# Patient Record
Sex: MALE | Race: WHITE | Employment: OTHER | URBAN - METROPOLITAN AREA
[De-identification: names, ages, dates, MRNs, and addresses within clinical notes are randomized per-mention and may not be internally consistent; named-entity substitution may affect disease eponyms.]

---

## 2020-07-09 ENCOUNTER — HOSPITAL ENCOUNTER (INPATIENT)
Age: 65
LOS: 6 days | Discharge: HOME | DRG: 175 | End: 2020-07-15
Attending: EMERGENCY MEDICINE | Admitting: HOSPITALIST
Payer: MEDICARE

## 2020-07-09 ENCOUNTER — APPOINTMENT (OUTPATIENT)
Dept: CT IMAGING | Age: 65
DRG: 175 | End: 2020-07-09
Attending: EMERGENCY MEDICINE
Payer: MEDICARE

## 2020-07-09 ENCOUNTER — APPOINTMENT (OUTPATIENT)
Dept: GENERAL RADIOLOGY | Age: 65
DRG: 175 | End: 2020-07-09
Attending: EMERGENCY MEDICINE
Payer: MEDICARE

## 2020-07-09 DIAGNOSIS — I26.09 ACUTE PULMONARY EMBOLISM WITH ACUTE COR PULMONALE, UNSPECIFIED PULMONARY EMBOLISM TYPE (HCC): Primary | ICD-10-CM

## 2020-07-09 DIAGNOSIS — R00.0 SINUS TACHYCARDIA: ICD-10-CM

## 2020-07-09 DIAGNOSIS — R06.02 SOB (SHORTNESS OF BREATH): ICD-10-CM

## 2020-07-09 PROBLEM — I26.99 PULMONARY EMBOLISM (HCC): Status: ACTIVE | Noted: 2020-07-09

## 2020-07-09 LAB
ALBUMIN SERPL-MCNC: 3.7 G/DL (ref 3.4–5)
ALBUMIN/GLOB SERPL: 1 {RATIO} (ref 0.8–1.7)
ALP SERPL-CCNC: 93 U/L (ref 45–117)
ALT SERPL-CCNC: 52 U/L (ref 16–61)
ANION GAP SERPL CALC-SCNC: 5 MMOL/L (ref 3–18)
APTT PPP: 151.1 SEC (ref 23–36.4)
APTT PPP: 31.9 SEC (ref 23–36.4)
AST SERPL-CCNC: 25 U/L (ref 10–38)
ATRIAL RATE: 113 BPM
BASOPHILS # BLD: 0 K/UL (ref 0–0.1)
BASOPHILS NFR BLD: 0 % (ref 0–2)
BILIRUB SERPL-MCNC: 1.9 MG/DL (ref 0.2–1)
BNP SERPL-MCNC: 6979 PG/ML (ref 0–900)
BUN SERPL-MCNC: 21 MG/DL (ref 7–18)
BUN/CREAT SERPL: 17 (ref 12–20)
CALCIUM SERPL-MCNC: 9.1 MG/DL (ref 8.5–10.1)
CALCULATED P AXIS, ECG09: 64 DEGREES
CALCULATED R AXIS, ECG10: 83 DEGREES
CALCULATED T AXIS, ECG11: 3 DEGREES
CHLORIDE SERPL-SCNC: 107 MMOL/L (ref 100–111)
CK MB CFR SERPL CALC: 5 % (ref 0–4)
CK MB SERPL-MCNC: 2.8 NG/ML (ref 5–25)
CK SERPL-CCNC: 56 U/L (ref 39–308)
CO2 SERPL-SCNC: 26 MMOL/L (ref 21–32)
CREAT SERPL-MCNC: 1.22 MG/DL (ref 0.6–1.3)
D DIMER PPP FEU-MCNC: 5.38 UG/ML(FEU)
DIAGNOSIS, 93000: NORMAL
DIFFERENTIAL METHOD BLD: ABNORMAL
EOSINOPHIL # BLD: 0 K/UL (ref 0–0.4)
EOSINOPHIL NFR BLD: 0 % (ref 0–5)
ERYTHROCYTE [DISTWIDTH] IN BLOOD BY AUTOMATED COUNT: 13.8 % (ref 11.6–14.5)
GLOBULIN SER CALC-MCNC: 3.6 G/DL (ref 2–4)
GLUCOSE SERPL-MCNC: 94 MG/DL (ref 74–99)
HCT VFR BLD AUTO: 53 % (ref 36–48)
HGB BLD-MCNC: 17.9 G/DL (ref 13–16)
LYMPHOCYTES # BLD: 1.7 K/UL (ref 0.9–3.6)
LYMPHOCYTES NFR BLD: 14 % (ref 21–52)
MAGNESIUM SERPL-MCNC: 2.4 MG/DL (ref 1.6–2.6)
MCH RBC QN AUTO: 31.7 PG (ref 24–34)
MCHC RBC AUTO-ENTMCNC: 33.8 G/DL (ref 31–37)
MCV RBC AUTO: 93.8 FL (ref 74–97)
MONOCYTES # BLD: 1.1 K/UL (ref 0.05–1.2)
MONOCYTES NFR BLD: 9 % (ref 3–10)
NEUTS SEG # BLD: 9.8 K/UL (ref 1.8–8)
NEUTS SEG NFR BLD: 77 % (ref 40–73)
P-R INTERVAL, ECG05: 130 MS
PLATELET # BLD AUTO: 169 K/UL (ref 135–420)
PMV BLD AUTO: 10.2 FL (ref 9.2–11.8)
POTASSIUM SERPL-SCNC: 4.8 MMOL/L (ref 3.5–5.5)
PROT SERPL-MCNC: 7.3 G/DL (ref 6.4–8.2)
Q-T INTERVAL, ECG07: 340 MS
QRS DURATION, ECG06: 78 MS
QTC CALCULATION (BEZET), ECG08: 466 MS
RBC # BLD AUTO: 5.65 M/UL (ref 4.7–5.5)
SODIUM SERPL-SCNC: 138 MMOL/L (ref 136–145)
TROPONIN I SERPL-MCNC: 0.04 NG/ML (ref 0–0.04)
VENTRICULAR RATE, ECG03: 113 BPM
WBC # BLD AUTO: 12.6 K/UL (ref 4.6–13.2)

## 2020-07-09 PROCEDURE — 96365 THER/PROPH/DIAG IV INF INIT: CPT

## 2020-07-09 PROCEDURE — 80053 COMPREHEN METABOLIC PANEL: CPT

## 2020-07-09 PROCEDURE — 65660000000 HC RM CCU STEPDOWN

## 2020-07-09 PROCEDURE — 82550 ASSAY OF CK (CPK): CPT

## 2020-07-09 PROCEDURE — 85025 COMPLETE CBC W/AUTO DIFF WBC: CPT

## 2020-07-09 PROCEDURE — 96376 TX/PRO/DX INJ SAME DRUG ADON: CPT

## 2020-07-09 PROCEDURE — 85730 THROMBOPLASTIN TIME PARTIAL: CPT

## 2020-07-09 PROCEDURE — 74011636320 HC RX REV CODE- 636/320: Performed by: EMERGENCY MEDICINE

## 2020-07-09 PROCEDURE — 74011250637 HC RX REV CODE- 250/637: Performed by: HOSPITALIST

## 2020-07-09 PROCEDURE — 99284 EMERGENCY DEPT VISIT MOD MDM: CPT

## 2020-07-09 PROCEDURE — 71275 CT ANGIOGRAPHY CHEST: CPT

## 2020-07-09 PROCEDURE — 83880 ASSAY OF NATRIURETIC PEPTIDE: CPT

## 2020-07-09 PROCEDURE — 85379 FIBRIN DEGRADATION QUANT: CPT

## 2020-07-09 PROCEDURE — 71045 X-RAY EXAM CHEST 1 VIEW: CPT

## 2020-07-09 PROCEDURE — 74011250636 HC RX REV CODE- 250/636: Performed by: EMERGENCY MEDICINE

## 2020-07-09 PROCEDURE — 36415 COLL VENOUS BLD VENIPUNCTURE: CPT

## 2020-07-09 PROCEDURE — 87635 SARS-COV-2 COVID-19 AMP PRB: CPT

## 2020-07-09 PROCEDURE — 83735 ASSAY OF MAGNESIUM: CPT

## 2020-07-09 PROCEDURE — 93005 ELECTROCARDIOGRAM TRACING: CPT

## 2020-07-09 RX ORDER — ASCORBIC ACID 250 MG
500 TABLET ORAL DAILY
Status: DISCONTINUED | OUTPATIENT
Start: 2020-07-10 | End: 2020-07-15

## 2020-07-09 RX ORDER — CHOLECALCIFEROL (VITAMIN D3) 125 MCG
5 CAPSULE ORAL
Status: DISCONTINUED | OUTPATIENT
Start: 2020-07-09 | End: 2020-07-15

## 2020-07-09 RX ORDER — ZINC SULFATE 50(220)MG
1 CAPSULE ORAL DAILY
Status: DISCONTINUED | OUTPATIENT
Start: 2020-07-10 | End: 2020-07-15

## 2020-07-09 RX ORDER — CYANOCOBALAMIN (VITAMIN B-12) 500 MCG
2000 TABLET ORAL DAILY
Status: DISCONTINUED | OUTPATIENT
Start: 2020-07-10 | End: 2020-07-15

## 2020-07-09 RX ORDER — PANTOPRAZOLE SODIUM 40 MG/1
40 TABLET, DELAYED RELEASE ORAL DAILY
Status: DISCONTINUED | OUTPATIENT
Start: 2020-07-10 | End: 2020-07-15 | Stop reason: HOSPADM

## 2020-07-09 RX ORDER — HEPARIN SODIUM 1000 [USP'U]/ML
80 INJECTION, SOLUTION INTRAVENOUS; SUBCUTANEOUS ONCE
Status: COMPLETED | OUTPATIENT
Start: 2020-07-09 | End: 2020-07-09

## 2020-07-09 RX ORDER — HEPARIN SODIUM 10000 [USP'U]/100ML
18-36 INJECTION, SOLUTION INTRAVENOUS
Status: DISPENSED | OUTPATIENT
Start: 2020-07-09 | End: 2020-07-14

## 2020-07-09 RX ADMIN — IOPAMIDOL 100 ML: 755 INJECTION, SOLUTION INTRAVENOUS at 13:01

## 2020-07-09 RX ADMIN — HEPARIN SODIUM 15 UNITS/KG/HR: 10000 INJECTION, SOLUTION INTRAVENOUS at 23:26

## 2020-07-09 RX ADMIN — Medication 5 MG: at 22:20

## 2020-07-09 RX ADMIN — HEPARIN SODIUM 18 UNITS/KG/HR: 10000 INJECTION, SOLUTION INTRAVENOUS at 13:56

## 2020-07-09 RX ADMIN — HEPARIN SODIUM 8530 UNITS: 1000 INJECTION INTRAVENOUS; SUBCUTANEOUS at 13:56

## 2020-07-09 NOTE — ROUTINE PROCESS
Bedside and Verbal shift change report given to CAROLE Garcia R.N. (oncoming nurse) by LETICIA Zaragoza R.N. (offgoing nurse). Report included the following information SBAR, Kardex, Intake/Output, MAR, Accordion, Recent Results and Med Rec Status.

## 2020-07-09 NOTE — ED PROVIDER NOTES
EMERGENCY DEPARTMENT HISTORY AND PHYSICAL EXAM    Date: 7/9/2020  Patient Name: Judie Dobbins    History of Presenting Illness     Chief Complaint   Patient presents with    Shortness of Breath         History Provided By: Patient    Judie Dobbins is a 72 y.o. male who presents to the emergency department C/O shortness of breath. Patient states his shortness of breath has been intermittently been getting worse over the last 3-1/2 months. He does note that he has been on a boat since March sailing from the [facility]. States he is originally from [facility]. He denies any chest pain. States that shortness of breath is worse on exertion. Denies any cough or fevers or direct contact with a coronavirus patient to his knowledge. Patient does note that he has a history of prostate cancer that is in remission as well as a pulmonary embolism that occurred after his knee surgery about 8 years ago. States he is on daily aspirin but no other blood thinning medications. States that shortness of breath started to become worse Saturday about 4 days ago and is now getting to the point where he is unable to walk several steps without getting out of breath. .     PCP: Other, MD Fernanda        Past History     Past Medical History:  Past Medical History:   Diagnosis Date    D[facility] of leg (deep venous thrombosis) [address] [address]     H pylori ulcer     PE (pulmonary thromboembolism) ([facility] [address]        Past Surgical History:  History reviewed. No pertinent surgical history. Family History:  History reviewed. No pertinent family history. Social History:  Social History     Tobacco Use    Smoking status: Never Smoker    Smokeless tobacco: Never Used   Substance Use Topics    Alcohol use: Yes    Drug use: Not on file       Allergies:  No Known Allergies      Review of Systems   Review of Systems   Constitutional: Positive for fatigue. Negative for fever.    Respiratory: Positive for chest tightness and shortness of breath. Cardiovascular: Negative for chest pain. Gastrointestinal: Negative for abdominal pain, nausea and vomiting. Neurological: Positive for weakness. All other systems reviewed and are negative. Physical Exam     Vitals:    07/09/20 1134 07/09/20 1402   BP: (!) 140/96 131/88   Pulse: (!) 121 (!) 110   Resp: 20 18   Temp: 97.1 °F (36.2 °C)    SpO2: 97% (!) 56%   Weight: 106.6 kg (235 lb)    Height: 6' 2\" (1.88 m)      Physical Exam    Nursing notes and vital signs reviewed    Airway: intact, speaking normally  Breathing:  Moderate distress, no cyanosis  Circulation: Peripheral pulses equal    Constitutional: Non toxic appearing, patient appears in moderate distress  HEENT & Neck: Normocephalic, Atraumatic, PERRL, EOMI, No rhinorrhea, external nose normal, external ears normal, mucous membranes moist, No stridor, No JVD  Cardiovascular: Tachycardic, regular rhythm, no murmurs  Chest: Nephric and increased work of breathing and chest excursion bilaterally  Lungs: Clear to ausculation bilaterally  Abdomen: Abdominal breathing noted, soft, non tender, non distended, normoactive bowel sounds, No rigidity, no peritoneal signs  Musculoskeletal: No evidence of trauma or deformity to the back or neck  Extremities: No evidence of trauma or deformity, no LE edema  Skin: Warm, No obvious rashes  Neuro: Alert and oriented x 3, CN 2-12 intact, normal speech, strength and sensation full and symmetric bilaterally, normal coordination  Psychiatric: Normal mood and affect      Diagnostic Study Results     Labs -     Recent Results (from the past 72 hour(s))   EKG, 12 LEAD, INITIAL    Collection Time: 07/09/20 11:45 AM   Result Value Ref Range    Ventricular Rate 113 BPM    Atrial Rate 113 BPM    P-R Interval 130 ms    QRS Duration 78 ms    Q-T Interval 340 ms    QTC Calculation (Bezet) 466 ms    Calculated P Axis 64 degrees    Calculated R Axis 83 degrees    Calculated T Axis 3 degrees    Diagnosis       Sinus tachycardia  T wave abnormality, consider inferior ischemia  Abnormal ECG  No previous ECGs available     CBC WITH AUTOMATED DIFF    Collection Time: 07/09/20 11:47 AM   Result Value Ref Range    WBC 12.6 4.6 - 13.2 K/uL    RBC 5.65 (H) 4.70 - 5.50 M/uL    HGB 17.9 (H) 13.0 - 16.0 g/dL    HCT 53.0 (H) 36.0 - 48.0 %    MCV 93.8 74.0 - 97.0 FL    MCH 31.7 24.0 - 34.0 PG    MCHC 33.8 31.0 - 37.0 g/dL    RDW 13.8 11.6 - 14.5 %    PLATELET 487 735 - 747 K/uL    MPV 10.2 9.2 - 11.8 FL    NEUTROPHILS 77 (H) 40 - 73 %    LYMPHOCYTES 14 (L) 21 - 52 %    MONOCYTES 9 3 - 10 %    EOSINOPHILS 0 0 - 5 %    BASOPHILS 0 0 - 2 %    ABS. NEUTROPHILS 9.8 (H) 1.8 - 8.0 K/UL    ABS. LYMPHOCYTES 1.7 0.9 - 3.6 K/UL    ABS. MONOCYTES 1.1 0.05 - 1.2 K/UL    ABS. EOSINOPHILS 0.0 0.0 - 0.4 K/UL    ABS. BASOPHILS 0.0 0.0 - 0.1 K/UL    DF AUTOMATED     METABOLIC PANEL, COMPREHENSIVE    Collection Time: 07/09/20 11:47 AM   Result Value Ref Range    Sodium 138 136 - 145 mmol/L    Potassium 4.8 3.5 - 5.5 mmol/L    Chloride 107 100 - 111 mmol/L    CO2 26 21 - 32 mmol/L    Anion gap 5 3.0 - 18 mmol/L    Glucose 94 74 - 99 mg/dL    BUN 21 (H) 7.0 - 18 MG/DL    Creatinine 1.22 0.6 - 1.3 MG/DL    BUN/Creatinine ratio 17 12 - 20      GFR est AA >60 >60 ml/min/1.73m2    GFR est non-AA 60 (L) >60 ml/min/1.73m2    Calcium 9.1 8.5 - 10.1 MG/DL    Bilirubin, total 1.9 (H) 0.2 - 1.0 MG/DL    ALT (SGPT) 52 16 - 61 U/L    AST (SGOT) 25 10 - 38 U/L    Alk.  phosphatase 93 45 - 117 U/L    Protein, total 7.3 6.4 - 8.2 g/dL    Albumin 3.7 3.4 - 5.0 g/dL    Globulin 3.6 2.0 - 4.0 g/dL    A-G Ratio 1.0 0.8 - 1.7     NT-PRO BNP    Collection Time: 07/09/20 11:47 AM   Result Value Ref Range    NT pro-BNP 6,979 (H) 0 - 900 PG/ML   CARDIAC PANEL,(CK, CKMB & TROPONIN)    Collection Time: 07/09/20 11:47 AM   Result Value Ref Range    CK - MB 2.8 <3.6 ng/ml    CK-MB Index 5.0 (H) 0.0 - 4.0 %    CK 56 39 - 308 U/L    Troponin-I, QT 0.04 0.0 - 0.045 NG/ML   D DIMER Collection Time: 07/09/20 11:47 AM   Result Value Ref Range    D DIMER 5.38 (H) <0.46 ug/ml(FEU)   MAGNESIUM    Collection Time: 07/09/20 11:47 AM   Result Value Ref Range    Magnesium 2.4 1.6 - 2.6 mg/dL   PTT    Collection Time: 07/09/20 11:47 AM   Result Value Ref Range    aPTT 31.9 23.0 - 36.4 SEC       Radiologic Studies -   CTA CHEST W OR W WO CONT   Final Result   IMPRESSION:      1. Extensive bilateral lobar and segmental pulmonary emboli as detailed above,   with evidence of right heart strain. This was discussed with Dr. Guevara Ling at   1:21 PM.      2. Mild nonspecific peripheral groundglass density right upper lobe. 3. 2 mm right upper lobe nodule nonspecific but likely benign. 2.      XR CHEST PORT   Final Result   IMPRESSION:      1. No acute cardiopulmonary process. CT Results  (Last 48 hours)               07/09/20 1311  CTA CHEST W OR W WO CONT Final result    Impression:  IMPRESSION:       1. Extensive bilateral lobar and segmental pulmonary emboli as detailed above,   with evidence of right heart strain. This was discussed with Dr. Guevara Ling at   1:21 PM.       2. Mild nonspecific peripheral groundglass density right upper lobe. 3. 2 mm right upper lobe nodule nonspecific but likely benign. 2.       Narrative:  EXAM: CTA Chest       CLINICAL INDICATION/HISTORY: Shortness of breath, tachycardia. Possible PE.       COMPARISON: Plain film chest same day       TECHNIQUE: Axial CT imaging from the thoracic inlet through the diaphragm with   intravenous contrast utilizing CTA study for pulmonary artery evaluation. Coronal and sagittal MIP reformations were generated at a separate workstation. One or more dose reduction techniques were used on this CT: automated exposure   control, adjustment of the mAs and/or kVp according to patient's size, and   iterative reconstruction techniques.  The specific techniques utilized on this CT   exam have been documented in the patient's electronic medical record. Digital   imaging and communications and medicine (DICOM) format image data are available   to nonaffiliated external healthcare facilities or entities on a secure, media   free, reciprocally searchable basis with patient authorization for at least a 12   month period after this study. _______________       FINDINGS:       EXAM QUALITY: Overall exam quality is adequate. Pulmonary arterial enhancement   is adequate. The breath hold is satisfactory. PULMONARY ARTERIES: There is extensive pulmonary emboli, with complete occlusion   of the proximal left lower lobe artery with no contrast seen more distally   within the left lower lobe peripheral vessels. There is minimal eccentric clot   within proximal lingular segment. There is some partial nonocclusive clot within   anterior segment left upper lobe artery. There is complete occlusion of the   proximal right upper lobe artery with absent contrast seen more peripherally in   the right upper lobe vessels. Partial nonocclusive clot seen within the proximal   middle lobe arteries. There is occlusive clot involving proximal lateral   segmental right lower lobe artery. No filling defect within the main pulmonary   arteries. MEDIASTINUM: There is evidence of right heart strain with RV/LV ratio greater   than 1, right ventricle measuring 5.5 cm and left ventricle measuring 3.9 cm. There is mild associated septal bowing and some reflux of contrast into the IVC   and hepatic veins. There is also prominence of the azygos vein. Normal heart   size with no pericardial effusion. Coronary artery calcifications. Thoracic   aorta unremarkable. LYMPH NODES: No enlarged mediastinal or hilar nodes by size criteria. AIRWAY: Unremarkable. LUNGS: There is 2 mm diameter parenchymal nodule within the posterior right   upper lobe, axial image #30.  Mild peripheral groundglass density lateral and   posterior lateral right upper lobe. No other area of consolidation or mass. PLEURA: No pleural effusion or pneumothorax. UPPER ABDOMEN: Visualized upper abdomen is unremarkable. .       OTHER: Degenerative lower cervical and thoracic spondylosis with no acute or   aggressive osseous findings. Proximal right humeral prosthesis noted. _______________               CXR Results  (Last 48 hours)               07/09/20 1155  XR CHEST PORT Final result    Impression:  IMPRESSION:       1. No acute cardiopulmonary process. Narrative:  EXAM: XR CHEST PORT       CLINICAL INDICATION/HISTORY: sob, tachycardia   -Additional: None       COMPARISON: None       TECHNIQUE: Frontal view of the chest       _______________       FINDINGS:       HEART AND MEDIASTINUM: Midline cardiac silhouette, normal in size. Unremarkable   hilar vascular structures. LUNGS AND PLEURAL SPACES: No focal consolidation, parenchymal opacity. No   pneumothorax or pleural effusion. BONY THORAX AND SOFT TISSUES: Total right shoulder arthroplasty. Moderate   appearing Left glenohumeral DJD. Otherwise, no acute osseous abnormality       _______________                 Medications given in the ED-  Medications   heparin 25,000 units in D5W 250 ml infusion (18 Units/kg/hr × 106.6 kg IntraVENous New Bag 7/9/20 1356)   iopamidoL (ISOVUE-370) 76 % injection 100 mL (100 mL IntraVENous Given 7/9/20 1301)   heparin (porcine) 1,000 unit/mL injection 8,530 Units (8,530 Units IntraVENous Given 7/9/20 1356)         Medical Decision Making   I am the first provider for this patient. I reviewed the vital signs, available nursing notes, past medical history, past surgical history, family history and social history. Vital Signs-Reviewed the patient's vital signs.     Pulse Oximetry Analysis - 97% on Room air     Cardiac Monitor:  Rate: 107 bpm  Rhythm: sinus    EKG interpretation: (Preliminary)  EKG read by Dr. Dyllan Harris 12:02 PM   Rate 113 sinus tachycardia, nonspecific T wave inversion in lead III as well as the anterior leads, no ST changes    Records Reviewed: Nursing Notes and Old Medical Records    Procedures:  Procedures    ED Course:   Considering the patient's history, tachypnea and tachycardia there is a high suspicion for pulmonary embolism. Chest x-ray shows no evidence of acute process. D-dimer noted to be 5 and BNP noted be 6000, negative troponin. Chest x-ray normal.  CTA was read by radiology and contacted me regarding multiple pulmonary embolisms in the left lower lobe occlusive as well as occlusive in the right upper lobe with some segmental in the right lung base with some mild heart strain    CONSULT NOTE:   Dr. Ross Carvalho spoke with Dr. Yesenia Sparks   Specialty: pulm  Discussed pt's hx, disposition, and available diagnostic and imaging results over the telephone. Reviewed care plans. Recommends to continue with IV heparin and admit to hospitalist service. After discussion with hospitalist service regarding the patient's case it was noted that the patient has a right upper lobe groundglass appearance and had recommended a COVID swab. Will place the patient on droplet precautions and obtain coronavirus swab. The patient himself denies any cough, fevers or body aches or headaches or sore throat or recent sensation of illness    Diagnosis and Disposition     Critical Care: 1:28 PM  I have spent 35 minutes of critical care time involved in lab review, consultations with specialist, family decision-making, and documentation. During this entire length of time I was immediately available to the patient. Critical Care: The reason for providing this level of medical care for this critically ill patient was due a critical illness that impaired one or more vital organ systems such that there was a high probability of imminent or life threatening deterioration in the patients condition.  This care involved high complexity decision making to assess, manipulate, and support vital system functions, to treat this degreee vital organ system failure and to prevent further life threatening deterioration of the patients condition. Core Measures:  For Hospitalized Patients:    1. Hospitalization Decision Time:  The decision to hospitalize the patient was made by Nettie Mccarthy DO at 2:25 PM on 7/9/2020    2. Aspirin: Aspirin was not given because the patient did not present with a stroke at the time of their Emergency Department evaluation    1:28 PM  Patient is being admitted to the hospital by Dr. Remi Lambert. The results of their tests and reasons for their admission have been discussed with them and/or available family. They convey agreement and understanding for the need to be admitted and for their admission diagnosis. CONDITIONS ON ADMISSION:  Sepsis is not present at the time of admission. Deep Vein Thrombosis possibly present at the time of admission. Thrombosis is present at the time of admission. Urinary Tract Infection is not present at the time of admission. Pneumonia is not present at the time of admission. MRSA is not present at the time of admission. Wound infection is not present at the time of admission. Pressure Ulcer is not present at the time of admission. CLINICAL IMPRESSION:    1. Acute pulmonary embolism with acute cor pulmonale, unspecified pulmonary embolism type ([address]    2. Sinus tachycardia    3. SOB (shortness of breath)          Please note that this dictation was completed with [employer], the computer voice recognition software. Quite often unanticipated grammatical, syntax, homophones, and other interpretive errors are inadvertently transcribed by the computer software. Please disregard these errors. Please excuse any errors that have escaped final proofreading.

## 2020-07-09 NOTE — PROGRESS NOTES
Problem: Falls - Risk of  Goal: *Absence of Falls  Description: Document [facility] Fall Risk and appropriate interventions in the flowsheet.   Outcome: Progressing Towards Goal  Note: Fall Risk Interventions:            Medication Interventions: Patient to call before getting OOB, Teach patient to arise slowly                   Problem: Patient Education: Go to Patient Education Activity  Goal: Patient/Family Education  Outcome: Progressing Towards Goal

## 2020-07-09 NOTE — PROGRESS NOTES
1445:Patient care received. Patient alert and oriented x 4. Patient resting in bed denies pain. Patient stable. Call light with in reach bed in lowest position.

## 2020-07-09 NOTE — ED TRIAGE NOTES
Patient with complaints of intermittent shortness of breath for the past 3.5 month and states that it started this time on Saturday. Patient states that he lives on a boat and has been unable to seek medical attention.

## 2020-07-09 NOTE — ED NOTES
TRANSFER - OUT REPORT:    Verbal report given to 354(name) юлия Eisenberg  being transferred to Sharon(unit) for ordered procedure       Report consisted of patients Situation, Background, Assessment and   Recommendations(SBAR). Information from the following report(s) SBAR, Kardex and ED Summary was reviewed with the receiving nurse. Lines:   Peripheral IV 07/09/20 Left Antecubital (Active)        Opportunity for questions and clarification was provided.       Patient transported with:   Registered Nurse

## 2020-07-09 NOTE — H&P
History & Physical    Patient: Genaro Chowdhury MRN: [id number]  CSN: [id number]    YOB: 1955  Age: 72 y.o. Sex: male      DOA: 7/9/2020  Primary Care Provider:  Carla, MD Fernanda      Assessment/Plan     Patient Active Problem List   Diagnosis Code    Acute pulmonary embolism with acute cor pulmonale (HCC) I26.09    Sinus tachycardia R00.0    SOB (shortness of breath) R06.02       Admit to COVID 19 unit    Acute PE with acute cor pulmonale -  CTA chest with bilateral PE and evidence of heart strain. Started on heparin drip. Discussed with patient about choice of anticoagulation since he is living on a boat he would prefer NOAC. We will switch to this before discharge. Check echo  Check lower extremity Dopplers. Suspected COVID-19-  Given PE and CT findings of groundglass opacities will check for COVID-19. We will start on vitamin C, vitamin D3, zinc, melatonin. No leukocytosis or fever. Will hold onto starting any antibiotics. Estimated length of stay :2-3 days    CC: SOB       HPI:     Genaro Chowdhury is a 72 y.o. male who has past medical history of prostate cancer, H pylori, DVT, peptic ulcer disease presents to ER with concerns of shortness of breath. He reports that he has been having shortness of breath for about 3-1/2 months which is on and off. He has been staying on a boat for over 2 years. He reports that about a month ago he had some shortness of breath that was more than usual however it subsided subsided. This time he his shortness of breath has been progressive and getting worse. He denies any chest pain or cough. Or any syncopal episode. He reports that he had DVT about 8 years ago after he had his knee surgery. He was on anticoagulation. He has history of prostate cancer. In ER his CTA chest showed bilateral PE with evidence of right heart strain. CT chest also showed some groundglass opacities in lower lungs. His NT proBNP at 6979.     Past Medical History: Diagnosis Date    DVT of leg (deep venous thrombosis) (HCC)     H pylori ulcer     PE [facility]embol[facility]) [facility] [address]     Prostate CA ([facility]        History reviewed. No pertinent surgical history. History reviewed. No pertinent family history. Social History     Socioeconomic History    Marital status:      Spouse name: Not on file    Number of children: Not on file    Years of education: Not on file    Highest education level: Not on file   Tobacco Use    Smoking status: Never Smoker    Smokeless tobacco: Never Used   Substance and Sexual Activity    Alcohol use: Yes       Prior to Admission medications    Not on File       No Known Allergies    Review of Systems  Gen: No fever, chills, malaise, weight loss/gain. Heent: No headache, rhinorrhea, epistaxis, ear pain, hearing loss, sinus pain, neck pain/stiffness, sore throat. Heart: No chest pain, palpitations, BARRERA, pnd, or orthopnea. Resp: see above. GI: No nausea, vomiting, diarrhea, constipation, melena or hematochezia. : No urinary obstruction, dysuria or hematuria. Derm: No rash, new skin lesion or pruritis. Musc/skeletal: no bone or joint complains. Vasc: No edema, cyanosis or claudication. See above  Endo: No heat/cold intolerance, no polyuria,polydipsia or polyphagia. Neuro: No unilateral weakness, numbness, tingling. No seizures. Heme: No easy bruising or bleeding. Physical Exam:     Physical Exam:  Visit Vitals  /86 (BP 1 Location: Right arm, BP Patient Position: At rest)   Pulse 97   Temp 98.6 °F (37 °C)   Resp 20   Ht 6' 2\" (1.88 m)   Wt 106.6 kg (235 lb)   SpO2 98%   BMI 30.17 kg/m²      O2 Device: Room air    Temp (24hrs), Av.9 °F (36.6 °C), Min:97.1 °F (36.2 °C), Max:98.6 °F (37 °C)    No intake/output data recorded. No intake/output data recorded. General:  Awake, cooperative, no distress. Head:  Normocephalic, without obvious abnormality, atraumatic.    Eyes: Conjunctivae/corneas clear, sclera anicteric, PERRL, EOMs intact. Nose: Nares normal. No drainage or sinus tenderness. Throat: Lips, mucosa, and tongue normal.    Neck: Supple, symmetrical, trachea midline, no adenopathy. Lungs:   Clear to auscultation bilaterally. Heart:   S1, S2, no murmur, click, rub or gallop. Abdomen: Soft, non-tender. Bowel sounds normal. No masses,  No organomegaly. Extremities: Extremities normal, atraumatic, no cyanosis or edema. Capillary refill normal.   Pulses: 2+ and symmetric all extremities. Skin: Skin color pink, turgor normal. No rashes or lesions   Neurologic: CNII-XII intact. No focal motor or sensory deficit.        Labs Reviewed:    CMP:   Lab Results   Component Value Date/Time     07/09/2020 11:47 AM    K 4.8 07/09/2020 11:47 AM     07/09/2020 11:47 AM    CO2 26 07/09/2020 11:47 AM    AGAP 5 07/09/2020 11:47 AM    GLU 94 07/09/2020 11:47 AM    BUN 21 (H) 07/09/2020 11:47 AM    CREA 1.22 07/09/2020 11:47 AM    GFRAA >60 07/09/2020 11:47 AM    GFRNA 60 (L) 07/09/2020 11:47 AM    CA 9.1 07/09/2020 11:47 AM    MG 2.4 07/09/2020 11:47 AM    ALB 3.7 07/09/2020 11:47 AM    TP 7.3 07/09/2020 11:47 AM    GLOB 3.6 07/09/2020 11:47 AM    AGRAT 1.0 07/09/2020 11:47 AM    ALT 52 07/09/2020 11:47 AM     CBC:   Lab Results   Component Value Date/Time    WBC 12.6 07/09/2020 11:47 AM    HGB 17.9 (H) 07/09/2020 11:47 AM    HCT 53.0 (H) 07/09/2020 11:47 AM     07/09/2020 11:47 AM     All Cardiac Markers in the last 24 hours:   Lab Results   Component Value Date/Time    CPK 56 07/09/2020 11:47 AM    CKMB 2.8 07/09/2020 11:47 AM    CKND1 5.0 (H) 07/09/2020 11:47 AM    TROIQ 0.04 07/09/2020 11:47 AM         Procedures/imaging: see electronic medical records for all procedures/Xrays and details which were not copied into this note but were reviewed prior to creation of Plan    Please note that this dictation was completed with [employer], the computer voice recognition software. Quite often unanticipated grammatical, syntax, homophones, and other interpretive errors are inadvertently transcribed by the computer software. Please disregard these errors. Please excuse any errors that have escaped final proofreading.         CC: Fernanda Aguirre MD

## 2020-07-09 NOTE — ROUTINE PROCESS
TRANSFER - IN REPORT: 
 
Verbal report received from IZABELA Salazar R.N.(name) on Sammy Cook  being received from ED(unit) for routine progression of care Report consisted of patients Situation, Background, Assessment and  
Recommendations(SBAR). Information from the following report(s) SBAR, Kardex, Intake/Output, MAR, Accordion, Recent Results and Med Rec Status was reviewed with the receiving nurse. Opportunity for questions and clarification was provided. Assessment completed upon patients arrival to unit and care assumed.

## 2020-07-10 LAB
APTT PPP: 83.4 SEC (ref 23–36.4)
BASOPHILS # BLD: 0 K/UL (ref 0–0.1)
BASOPHILS NFR BLD: 0 % (ref 0–2)
DIFFERENTIAL METHOD BLD: ABNORMAL
EOSINOPHIL # BLD: 0.1 K/UL (ref 0–0.4)
EOSINOPHIL NFR BLD: 1 % (ref 0–5)
ERYTHROCYTE [DISTWIDTH] IN BLOOD BY AUTOMATED COUNT: 13.8 % (ref 11.6–14.5)
HCT VFR BLD AUTO: 49.6 % (ref 36–48)
HGB BLD-MCNC: 16.5 G/DL (ref 13–16)
LYMPHOCYTES # BLD: 2 K/UL (ref 0.9–3.6)
LYMPHOCYTES NFR BLD: 22 % (ref 21–52)
MCH RBC QN AUTO: 31.1 PG (ref 24–34)
MCHC RBC AUTO-ENTMCNC: 33.3 G/DL (ref 31–37)
MCV RBC AUTO: 93.4 FL (ref 74–97)
MONOCYTES # BLD: 0.9 K/UL (ref 0.05–1.2)
MONOCYTES NFR BLD: 9 % (ref 3–10)
NEUTS SEG # BLD: 6.4 K/UL (ref 1.8–8)
NEUTS SEG NFR BLD: 68 % (ref 40–73)
PLATELET # BLD AUTO: 149 K/UL (ref 135–420)
PMV BLD AUTO: 10.3 FL (ref 9.2–11.8)
RBC # BLD AUTO: 5.31 M/UL (ref 4.7–5.5)
WBC # BLD AUTO: 9.4 K/UL (ref 4.6–13.2)

## 2020-07-10 PROCEDURE — 85730 THROMBOPLASTIN TIME PARTIAL: CPT

## 2020-07-10 PROCEDURE — 74011250636 HC RX REV CODE- 250/636: Performed by: EMERGENCY MEDICINE

## 2020-07-10 PROCEDURE — 36415 COLL VENOUS BLD VENIPUNCTURE: CPT

## 2020-07-10 PROCEDURE — 74011250637 HC RX REV CODE- 250/637: Performed by: HOSPITALIST

## 2020-07-10 PROCEDURE — 85025 COMPLETE CBC W/AUTO DIFF WBC: CPT

## 2020-07-10 PROCEDURE — 65660000000 HC RM CCU STEPDOWN

## 2020-07-10 RX ADMIN — Medication 500 MG: at 08:10

## 2020-07-10 RX ADMIN — ZINC SULFATE 220 MG (50 MG) CAPSULE 1 CAPSULE: CAPSULE at 08:10

## 2020-07-10 RX ADMIN — Medication 5 MG: at 23:09

## 2020-07-10 RX ADMIN — HEPARIN SODIUM 15 UNITS/KG/HR: 10000 INJECTION, SOLUTION INTRAVENOUS at 22:58

## 2020-07-10 RX ADMIN — HEPARIN SODIUM 15 UNITS/KG/HR: 10000 INJECTION, SOLUTION INTRAVENOUS at 06:05

## 2020-07-10 RX ADMIN — PANTOPRAZOLE SODIUM 40 MG: 40 TABLET, DELAYED RELEASE ORAL at 08:10

## 2020-07-10 RX ADMIN — CHOLECALCIFEROL TAB 10 MCG (400 UNIT) 5 TABLET: 10 TAB at 08:10

## 2020-07-10 NOTE — PROGRESS NOTES
Problem: Falls - Risk of  Goal: *Absence of Falls  Description: Document Ivon Moulton Fall Risk and appropriate interventions in the flowsheet.   7/10/2020 0501 by Luis Marino  Outcome: Progressing Towards Goal  Note: Fall Risk Interventions:            Medication Interventions: Patient to call before getting OOB, Teach patient to arise slowly                7/10/2020 0500 by Luis Marino  Outcome: Progressing Towards Goal  Note: Fall Risk Interventions:            Medication Interventions: Patient to call before getting OOB, Teach patient to arise slowly                   Problem: Patient Education: Go to Patient Education Activity  Goal: Patient/Family Education  7/10/2020 0501 by Luis Marino  Outcome: Progressing Towards Goal  7/10/2020 0500 by Luis Marino  Outcome: Progressing Towards Goal

## 2020-07-10 NOTE — PROGRESS NOTES
Problem: Falls - Risk of  Goal: *Absence of Falls  Description: Document John Floyd Fall Risk and appropriate interventions in the flowsheet.   Outcome: Progressing Towards Goal  Note: Fall Risk Interventions:            Medication Interventions: Patient to call before getting OOB, Teach patient to arise slowly                   Problem: Patient Education: Go to Patient Education Activity  Goal: Patient/Family Education  Outcome: Progressing Towards Goal

## 2020-07-10 NOTE — PROGRESS NOTES
Problem: Falls - Risk of  Goal: *Absence of Falls  Description: Document Todd Moseley Fall Risk and appropriate interventions in the flowsheet.   Outcome: Progressing Towards Goal  Note: Fall Risk Interventions:            Medication Interventions: Patient to call before getting OOB, Teach patient to arise slowly                   Problem: Patient Education: Go to Patient Education Activity  Goal: Patient/Family Education  Outcome: Progressing Towards Goal

## 2020-07-10 NOTE — PROGRESS NOTES
Hospitalist Progress Note    Patient: Mikayla Current MRN: [id number]  CSN: [id number]    YOB: 1955  Age: 72 y.o. Sex: male    DOA: 7/9/2020 LOS:  LOS: 1 day                Assessment/Plan     Patient Active Problem List   Diagnosis Code    Acute pulmonary embolism with acute cor pulmonale (HCC) I26.09    Sinus tachycardia R00.0    SOB (shortness of breath) R06.02            72 y.o. male who has past medical history of prostate cancer, H pylori, DVT, peptic ulcer disease presents to ER with concerns of shortness of breath. Breathing better    Acute PE with acute cor pulmonale -  CTA chest with bilateral PE and evidence of heart strain. Started on heparin drip. Discussed with patient about choice of anticoagulation since he is living on a boat he would prefer NOAC. We will switch to this before discharge. Check echo  Check lower extremity Dopplers. Encourage incentive spirometry     Suspected COVID-19-  Given PE and CT findings of groundglass opacities will check for COVID-19. We will start on vitamin C, vitamin D3, zinc, melatonin.     No leukocytosis or fever. Will hold onto starting any antibiotics    Disposition : 1-2 days    Review of systems  General: No fevers or chills. Cardiovascular: No chest pain or pressure. No palpitations. Pulmonary: No shortness of breath. Gastrointestinal: No nausea, vomiting. Physical Exam:  General: Awake, cooperative, no acute distress    HEENT: NC, Atraumatic. PERRLA, anicteric sclerae. Lungs: CTA Bilaterally. No Wheezing/Rhonchi/Rales. Heart:  S1 S2,  No murmur, No Rubs, No Gallops  Abdomen: Soft, Non distended, Non tender.  +Bowel sounds,   Extremities: No c/c/e  Psych:   Not anxious or agitated. Neurologic:  No acute neurological deficit.            Vital signs/Intake and Output:  Visit Vitals  /90 (BP 1 Location: Left arm, BP Patient Position: At rest)   Pulse 93   Temp 98.5 °F (36.9 °C)   Resp 16   Ht 6' 2\" (1.88 m)   Wt 106.6 kg (235 lb)   SpO2 97%   BMI 30.17 kg/m²     Current Shift:  No intake/output data recorded. Last three shifts:  07/09 0701 - 07/10 1900  In: 500 [P.O.:500]  Out: 650 [Urine:650]            Labs: Results:       Chemistry Recent Labs     07/09/20  1147   GLU 94      K 4.8      CO2 26   BUN 21*   CREA 1.22   CA 9.1   AGAP 5   BUCR 17   AP 93   TP 7.3   ALB 3.7   GLOB 3.6   AGRAT 1.0      CBC w/Diff Recent Labs     07/10/20  0635 07/09/20  1147   WBC 9.4 12.6   RBC 5.31 5.65*   HGB 16.5* 17.9*   HCT 49.6* 53.0*    169   GRANS 68 77*   LYMPH 22 14*   EOS 1 0      Cardiac Enzymes Recent Labs     07/09/20  1147   CPK 56   CKND1 5.0*      Coagulation Recent Labs     07/10/20  0635 07/09/20  2125   APTT 83.4* 151.1*       Lipid Panel No results found for: CHOL, CHOLPOCT, CHOLX, CHLST, CHOLV, 512574, HDL, HDLP, LDL, LDLC, DLDLP, 203963, VLDLC, VLDL, TGLX, TRIGL, TRIGP, TGLPOCT, CHHD, CHHDX   BNP No results for input(s): BNPP in the last 72 hours.    Liver Enzymes Recent Labs     07/09/20  1147   TP 7.3   ALB 3.7   AP 93      Thyroid Studies No results found for: T4, T3U, TSH, TSHEXT     Procedures/imaging: see electronic medical records for all procedures/Xrays and details which were not copied into this note but were reviewed prior to creation of Plan

## 2020-07-10 NOTE — CONSULTS
[facility]                  Pulmonary, Critical Care, and Sleep Medicine     Name: Kailee Taylor MRN: [id number]   : 1955 Hospital: [facility] [place]    Date: 7/10/2020        [facility] Note                                              Consult requesting physician: Dr. Aftab Farmer, Dr. Volodymyr Dumont  Reason for Consult: PE      IMPRESSION:   Acute pulmonary embolism with acute cor pulmonale (bilateral extensive lobar and subsegmental PEs with RV strain. Hx PE/DVT after knee surgery in about ; lives on a boat since > 2 years; RV strain; d-dimer 5.38; BNP 6979; troponin 0 0.04). I26.09 ·     · 2 mm RUL pulmonary nodule. ·   Patient Active Problem List   Diagnosis Code    Acute pulmonary embolism with acute cor pulmonale (HCC) I26.09    Sinus tachycardia R00.0    SOB (shortness of breath) R06.02 ·           RECOMMENDATIONS:   Continue heparin drip. Patient lives on a boat and so frequent INR check would be problematic and so patient agrees to go on newer oral anticoagulants. Can transition from heparin to newer oral anticoagulants tomorrow. On room air resting. Echo and PVL LE ordered. COVID19 pending (ordered due to mild nonspecific GGO in RUL). Low probability for COVID19 pneumonia, but needs to rule out due to GGO and PE). Recommend outpatient hematology work-up. Consider outpatient pulmonary nodule evaluation. FiO2 to keep SpO2 >=92%, HOB >=30 degree, aspiration precautions, aggressive pulmonary toileting, incentive spirometry, PT/OT eval and treat, mobilization. Other issues management by primary team and respective consultants. Further recommendations will be based on the patient's response to recommended treatment and results of the investigation ordered. Quality Care: PPI, after knee surgery in the past, prophylaxis, HOB elevated, infection control all reviewed and addressed.    Discussed with patient radiologic work up showed, answered all questions to their satisfaction. Care plan discussed with nursing, Dr. Antonio Geller. Subjective/History:     Taniya White is a 72 y.o. male with PMHx significant for DVT/PE after knee surgery in about 2012, peptic ulcer disease, who came to ER with shortness of breath since 2.5 months off and on, diagnosed with mild nonspecific peripheral GGO RUL with extensive bilateral lobar and segmental pulmonary emboli with right heart strain. Patient lives on a boat since more than 2 years. Denies recent surgery, hospitalization, acute illness, long car or airline travel. Review of Systems:   HEENT: No epistaxis, no nasal drainage, no difficulty in swallowing, no redness in eyes  Respiratory: as above  Cardiovascular: no chest pain, no palpitations, no chronic leg edema, no syncope  Gastrointestinal: no abd pain, no vomiting, no diarrhea, no bleeding symptoms  Genitourinary: No urinary symptoms or hematuria  Integument/breast: No ulcers or rashes  Musculoskeletal:Neg  Neurological: No focal weakness, no seizures, no headaches  Behvioral/Psych: No anxiety, no depression  Constitutional: No fever, no chills, no weight loss, no night sweats       No Known Allergies     Past Medical History:   Diagnosis Date    DVT of leg (deep venous thrombosis) (HCC)     H pylori ulcer     PE (pulmonary thromboembolism) (HCC)     Prostate CA ([facility] [address]         History reviewed. No pertinent surgical history. History reviewed. No pertinent family history.      Social History     Tobacco Use    Smoking status: Never Smoker    Smokeless tobacco: Never Used   Substance Use Topics    Alcohol use: Yes        Prior to Admission medications    Not on File       Current Facility-Administered Medications   Medication Dose Route Frequency    heparin 25,000 units in D5W 250 ml infusion  18-36 Units/kg/hr IntraVENous TITRATE    ascorbic acid (vitamin C) (VITAMIN C) tablet 500 mg  500 mg Oral DAILY    cholecalciferol (VITAMIN D3) (400 Units /10 mcg) tablet 5 Tab  2,000 Units Oral DAILY    zinc sulfate (ZINCATE) 220 (50) mg capsule 1 Cap  1 Cap Oral DAILY    melatonin tablet 5 mg  5 mg Oral QHS    pantoprazole (PROTONIX) tablet 40 mg  40 mg Oral DAILY         Objective:   Vital Signs:    Visit Vitals  /87 (BP 1 Location: Left arm, BP Patient Position: At rest)   Pulse 84   Temp 98.1 °F (36.7 °C)   Resp 16   Ht 6' 2\" (1.88 m)   Wt 106.6 kg (235 lb)   SpO2 97%   BMI 30.17 kg/m²       O2 Device: Room air       Temp (24hrs), Av.2 °F (36.8 °C), Min:97.1 °F (36.2 °C), Max:98.7 °F (37.1 °C)       Intake/Output:   Last shift:      No intake/output data recorded. Last 3 shifts:  1901 - 07/10 0700  In: -   Out: 300 [Urine:300]    Intake/Output Summary (Last 24 hours) at 7/10/2020 0841  Last data filed at 7/10/2020 0500  Gross per 24 hour   Intake    Output 300 ml   Net -300 ml       Physical Exam:     General/Neurology: Alert, Awake, NAD. Head:   Normocephalic, without obvious abnormality, atraumatic. Eye:   EOM intact, no scleral icterus, no pallor, no cyanosis. Nose:   No sinus tenderness, no erythema, no induration, no discharge  Throat:  Lips, mucosa, and tongue normal. No oral thrush. Neck:   Supple, symmetric. No carotid bruit. No lymphadenopathy. Trachea midline  Lung: Moderate air entry bilateral equal. No rales. No rhonchi. No wheezing. No stridors. No prolongded expiration. No accessory muscle use. Heart:   Regular rate & rhythm. S1 S2 present. No murmur. No JVD. Abdomen:  Soft. NT. ND. +BS. No masses. Extremities:  No pedal edema. No cyanosis. No clubbing. Pulses: 2+ and symmetric in DP. Capillary refill: normal.   Lymphatic:  No cervical or supraclavicular palpable lymphadenopathy. Musculoskeletal: No joint swelling. No tenderness. Skin:   Color, texture, turgor normal. No rashes or lesions.        Data:       Recent Results (from the past 24 hour(s))   EKG, 12 LEAD, INITIAL    Collection Time: 20 11:45 AM   Result Value Ref Range    Ventricular Rate 113 BPM    Atrial Rate 113 BPM    P-R Interval 130 ms    QRS Duration 78 ms    Q-T Interval 340 ms    QTC Calculation (Bezet) 466 ms    Calculated P Axis 64 degrees    Calculated R Axis 83 degrees    Calculated T Axis 3 degrees    Diagnosis       Sinus tachycardia  T wave abnormality, consider inferior ischemia  Abnormal ECG  No previous ECGs available  Confirmed by Prabhjot Stahl MD. ([id number]) on 7/9/2020 11:03:03 PM     CBC WITH AUTOMATED DIFF    Collection Time: 07/09/20 11:47 AM   Result Value Ref Range    WBC 12.6 4.6 - 13.2 K/uL    RBC 5.65 (H) 4.70 - 5.50 M/uL    HGB 17.9 (H) 13.0 - 16.0 g/dL    HCT 53.0 (H) 36.0 - 48.0 %    MCV 93.8 74.0 - 97.0 FL    MCH 31.7 24.0 - 34.0 PG    MCHC 33.8 31.0 - 37.0 g/dL    RDW 13.8 11.6 - 14.5 %    PLATELET 863 932 - 171 K/uL    MPV 10.2 9.2 - 11.8 FL    NEUTROPHILS 77 (H) 40 - 73 %    LYMPHOCYTES 14 (L) 21 - 52 %    MONOCYTES 9 3 - 10 %    EOSINOPHILS 0 0 - 5 %    BASOPHILS 0 0 - 2 %    ABS. NEUTROPHILS 9.8 (H) 1.8 - 8.0 K/UL    ABS. LYMPHOCYTES 1.7 0.9 - 3.6 K/UL    ABS. MONOCYTES 1.1 0.05 - 1.2 K/UL    ABS. EOSINOPHILS 0.0 0.0 - 0.4 K/UL    ABS. BASOPHILS 0.0 0.0 - 0.1 K/UL    DF AUTOMATED     METABOLIC PANEL, COMPREHENSIVE    Collection Time: 07/09/20 11:47 AM   Result Value Ref Range    Sodium 138 136 - 145 mmol/L    Potassium 4.8 3.5 - 5.5 mmol/L    Chloride 107 100 - 111 mmol/L    CO2 26 21 - 32 mmol/L    Anion gap 5 3.0 - 18 mmol/L    Glucose 94 74 - 99 mg/dL    BUN 21 (H) 7.0 - 18 MG/DL    Creatinine 1.22 0.6 - 1.3 MG/DL    BUN/Creatinine ratio 17 12 - 20      GFR est AA >60 >60 ml/min/1.73m2    GFR est non-AA 60 (L) >60 ml/min/1.73m2    Calcium 9.1 8.5 - 10.1 MG/DL    Bilirubin, total 1.9 (H) 0.2 - 1.0 MG/DL    ALT (SGPT) 52 16 - 61 U/L    AST (SGOT) 25 10 - 38 U/L    Alk.  phosphatase 93 45 - 117 U/L    Protein, total 7.3 6.4 - 8.2 g/dL    Albumin 3.7 3.4 - 5.0 g/dL    Globulin 3.6 2.0 - 4.0 g/dL    A-G Ratio 1.0 0.8 - 1.7     NT-PRO BNP Collection Time: 07/09/20 11:47 AM   Result Value Ref Range    NT pro-BNP 6,979 (H) 0 - 900 PG/ML   CARDIAC PANEL,(CK, CKMB & TROPONIN)    Collection Time: 07/09/20 11:47 AM   Result Value Ref Range    CK - MB 2.8 <3.6 ng/ml    CK-MB Index 5.0 (H) 0.0 - 4.0 %    CK 56 39 - 308 U/L    Troponin-I, QT 0.04 0.0 - 0.045 NG/ML   D DIMER    Collection Time: 07/09/20 11:47 AM   Result Value Ref Range    D DIMER 5.38 (H) <0.46 ug/ml(FEU)   MAGNESIUM    Collection Time: 07/09/20 11:47 AM   Result Value Ref Range    Magnesium 2.4 1.6 - 2.6 mg/dL   PTT    Collection Time: 07/09/20 11:47 AM   Result Value Ref Range    aPTT 31.9 23.0 - 36.4 SEC   SARS-COV-2    Collection Time: 07/09/20  2:30 PM   Result Value Ref Range    SARS-CoV-2 PENDING     Specimen source Nasopharyngeal     PTT    Collection Time: 07/09/20  9:25 PM   Result Value Ref Range    aPTT 151.1 (HH) 23.0 - 36.4 SEC   CBC WITH AUTOMATED DIFF    Collection Time: 07/10/20  6:35 AM   Result Value Ref Range    WBC 9.4 4.6 - 13.2 K/uL    RBC 5.31 4.70 - 5.50 M/uL    HGB 16.5 (H) 13.0 - 16.0 g/dL    HCT 49.6 (H) 36.0 - 48.0 %    MCV 93.4 74.0 - 97.0 FL    MCH 31.1 24.0 - 34.0 PG    MCHC 33.3 31.0 - 37.0 g/dL    RDW 13.8 11.6 - 14.5 %    PLATELET 956 760 - 752 K/uL    MPV 10.3 9.2 - 11.8 FL    NEUTROPHILS 68 40 - 73 %    LYMPHOCYTES 22 21 - 52 %    MONOCYTES 9 3 - 10 %    EOSINOPHILS 1 0 - 5 %    BASOPHILS 0 0 - 2 %    ABS. NEUTROPHILS 6.4 1.8 - 8.0 K/UL    ABS. LYMPHOCYTES 2.0 0.9 - 3.6 K/UL    ABS. MONOCYTES 0.9 0.05 - 1.2 K/UL    ABS. EOSINOPHILS 0.1 0.0 - 0.4 K/UL    ABS.  BASOPHILS 0.0 0.0 - 0.1 K/UL    DF AUTOMATED     PTT    Collection Time: 07/10/20  6:35 AM   Result Value Ref Range    aPTT 83.4 (H) 23.0 - 36.4 SEC         Chemistry Recent Labs     07/09/20  1147   GLU 94      K 4.8      CO2 26   BUN 21*   CREA 1.22   CA 9.1   MG 2.4   AGAP 5   BUCR 17   AP 93   TP 7.3   ALB 3.7   GLOB 3.6   AGRAT 1.0        Lactic Acid No results found for: LAC  No results for input(s): LAC in the last 72 hours. Liver Enzymes Protein, total   Date Value Ref Range Status   07/09/2020 7.3 6.4 - 8.2 g/dL Final     Albumin   Date Value Ref Range Status   07/09/2020 3.7 3.4 - 5.0 g/dL Final     Globulin   Date Value Ref Range Status   07/09/2020 3.6 2.0 - 4.0 g/dL Final     A-G Ratio   Date Value Ref Range Status   07/09/2020 1.0 0.8 - 1.7   Final     Alk. phosphatase   Date Value Ref Range Status   07/09/2020 93 45 - 117 U/L Final     Recent Labs     07/09/20  1147   TP 7.3   ALB 3.7   GLOB 3.6   AGRAT 1.0   AP 93        CBC w/Diff Recent Labs     07/10/20  0635 07/09/20  1147   WBC 9.4 12.6   RBC 5.31 5.65*   HGB 16.5* 17.9*   HCT 49.6* 53.0*    169   GRANS 68 77*   LYMPH 22 14*   EOS 1 0        Cardiac Enzymes Lab Results   Component Value Date/Time    CPK 56 07/09/2020 11:47 AM    CKMB 2.8 07/09/2020 11:47 AM    CKND1 5.0 (H) 07/09/2020 11:47 AM    TROIQ 0.04 07/09/2020 11:47 AM        BNP No results found for: BNP, BNPP, XBNPT     Coagulation Recent Labs     07/10/20  0635 07/09/20  2125 07/09/20  1147   APTT 83.4* 151.1* 31.9         Thyroid  No results found for: T4, T3U, TSH, TSHEXT    No results found for: T4     Urinalysis No results found for: COLOR, APPRN, SPGRU, REFSG, PIYUSH, PROTU, GLUCU, KETU, BILU, UROU, ARACELY, LEUKU, GLUKE, EPSU, BACTU, WBCU, RBCU, CASTS, UCRY     Micro  No results for input(s): SDES, CULT in the last 72 hours. No results for input(s): CULT in the last 72 hours. ABG No results for input(s): PHI, PHI, POC2, PCO2I, PO2, PO2I, HCO3, HCO3I, FIO2, FIO2I in the last 72 hours. CT (Most Recent) (CT chest reviewed by me) Results from Hospital Encounter encounter on 07/09/20   CTA CHEST W OR W WO CONT    Narrative EXAM: CTA Chest    CLINICAL INDICATION/HISTORY: Shortness of breath, tachycardia.  Possible PE.    COMPARISON: Plain film chest same day    TECHNIQUE: Axial CT imaging from the thoracic inlet through the diaphragm with  intravenous contrast utilizing CTA study for pulmonary artery evaluation. Coronal and sagittal MIP reformations were generated at a separate workstation. One or more dose reduction techniques were used on this CT: automated exposure  control, adjustment of the mAs and/or kVp according to patient's size, and  iterative reconstruction techniques. The specific techniques utilized on this CT  exam have been documented in the patient's electronic medical record. Digital  imaging and communications and medicine (DICOM) format image data are available  to nonaffiliated external healthcare facilities or entities on a secure, media  free, reciprocally searchable basis with patient authorization for at least a 12  month period after this study. _______________    FINDINGS:    EXAM QUALITY: Overall exam quality is adequate. Pulmonary arterial enhancement  is adequate. The breath hold is satisfactory. PULMONARY ARTERIES: There is extensive pulmonary emboli, with complete occlusion  of the proximal left lower lobe artery with no contrast seen more distally  within the left lower lobe peripheral vessels. There is minimal eccentric clot  within proximal lingular segment. There is some partial nonocclusive clot within  anterior segment left upper lobe artery. There is complete occlusion of the  proximal right upper lobe artery with absent contrast seen more peripherally in  the right upper lobe vessels. Partial nonocclusive clot seen within the proximal  middle lobe arteries. There is occlusive clot involving proximal lateral  segmental right lower lobe artery. No filling defect within the main pulmonary  arteries. MEDIASTINUM: There is evidence of right heart strain with RV/LV ratio greater  than 1, right ventricle measuring 5.5 cm and left ventricle measuring 3.9 cm. There is mild associated septal bowing and some reflux of contrast into the IVC  and hepatic veins.  There is also prominence of the azygos vein. Normal heart  size with no pericardial effusion. Coronary artery calcifications. Thoracic  aorta unremarkable. LYMPH NODES: No enlarged mediastinal or hilar nodes by size criteria. AIRWAY: Unremarkable. LUNGS: There is 2 mm diameter parenchymal nodule within the posterior right  upper lobe, axial image #30. Mild peripheral groundglass density lateral and  posterior lateral right upper lobe. No other area of consolidation or mass. PLEURA: No pleural effusion or pneumothorax. UPPER ABDOMEN: Visualized upper abdomen is unremarkable. .    OTHER: Degenerative lower cervical and thoracic spondylosis with no acute or  aggressive osseous findings. Proximal right humeral prosthesis noted. _______________      Impression IMPRESSION:    1. Extensive bilateral lobar and segmental pulmonary emboli as detailed above,  with evidence of right heart strain. This was discussed with Dr. Claudean Duty at  1:21 PM.    2. Mild nonspecific peripheral groundglass density right upper lobe. 3. 2 mm right upper lobe nodule nonspecific but likely benign. 2.            XR (Most Recent). CXR  reviewed by me and compared with previous CXR Results from Hospital Encounter encounter on 07/09/20   XR CHEST PORT    Narrative EXAM: XR CHEST PORT    CLINICAL INDICATION/HISTORY: sob, tachycardia  -Additional: None    COMPARISON: None    TECHNIQUE: Frontal view of the chest    _______________    FINDINGS:    HEART AND MEDIASTINUM: Midline cardiac silhouette, normal in size. Unremarkable  hilar vascular structures. LUNGS AND PLEURAL SPACES: No focal consolidation, parenchymal opacity. No  pneumothorax or pleural effusion. BONY THORAX AND SOFT TISSUES: Total right shoulder arthroplasty. Moderate  appearing Left glenohumeral DJD. Otherwise, no acute osseous abnormality    _______________      Impression IMPRESSION:    1. No acute cardiopulmonary process.              Justa Guzmán MD  7/10/2020

## 2020-07-10 NOTE — PROGRESS NOTES
0700:Received verbal bedside report from off going nurse CAROLE Bullock R.N. Patient care received. Patient alert and oriented x 4. Patient resting in bed denies pain. Patient stable. Call light with in reach bed in lowest position.

## 2020-07-10 NOTE — PROGRESS NOTES
1915  Assumed care of pt   2034  Shift assessment complete   2230  Received critical result for ptt - heparin drip changed per protocol   0005  Reassessment complete   0456  Reassessment complete     Shift uneventful     Bedside and Verbal shift change report given to Barb Elizabeth RN (oncoming nurse) by Arina Myers RN (offgoing nurse).  Report included the following information SBAR, Kardex, ED Summary, Intake/Output, MAR, Recent Results, Med Rec Status and Cardiac Rhythm NSR; .

## 2020-07-11 LAB
ANION GAP SERPL CALC-SCNC: 7 MMOL/L (ref 3–18)
APTT PPP: 74.8 SEC (ref 23–36.4)
BUN SERPL-MCNC: 15 MG/DL (ref 7–18)
BUN/CREAT SERPL: 16 (ref 12–20)
CALCIUM SERPL-MCNC: 8.1 MG/DL (ref 8.5–10.1)
CHLORIDE SERPL-SCNC: 107 MMOL/L (ref 100–111)
CO2 SERPL-SCNC: 24 MMOL/L (ref 21–32)
CREAT SERPL-MCNC: 0.96 MG/DL (ref 0.6–1.3)
ERYTHROCYTE [DISTWIDTH] IN BLOOD BY AUTOMATED COUNT: 13.9 % (ref 11.6–14.5)
GLUCOSE SERPL-MCNC: 104 MG/DL (ref 74–99)
HCT VFR BLD AUTO: 50.4 % (ref 36–48)
HGB BLD-MCNC: 16.7 G/DL (ref 13–16)
MCH RBC QN AUTO: 30.9 PG (ref 24–34)
MCHC RBC AUTO-ENTMCNC: 33.1 G/DL (ref 31–37)
MCV RBC AUTO: 93.2 FL (ref 74–97)
PLATELET # BLD AUTO: 160 K/UL (ref 135–420)
PMV BLD AUTO: 10.8 FL (ref 9.2–11.8)
POTASSIUM SERPL-SCNC: 4.3 MMOL/L (ref 3.5–5.5)
RBC # BLD AUTO: 5.41 M/UL (ref 4.7–5.5)
SARS-COV-2, COV2NT: NOT DETECTED
SODIUM SERPL-SCNC: 138 MMOL/L (ref 136–145)
SOURCE, COVRS: NORMAL
WBC # BLD AUTO: 9 K/UL (ref 4.6–13.2)

## 2020-07-11 PROCEDURE — 87635 SARS-COV-2 COVID-19 AMP PRB: CPT

## 2020-07-11 PROCEDURE — 80048 BASIC METABOLIC PNL TOTAL CA: CPT

## 2020-07-11 PROCEDURE — 74011250636 HC RX REV CODE- 250/636: Performed by: EMERGENCY MEDICINE

## 2020-07-11 PROCEDURE — 85027 COMPLETE CBC AUTOMATED: CPT

## 2020-07-11 PROCEDURE — 74011250637 HC RX REV CODE- 250/637: Performed by: HOSPITALIST

## 2020-07-11 PROCEDURE — 65660000000 HC RM CCU STEPDOWN

## 2020-07-11 PROCEDURE — 85730 THROMBOPLASTIN TIME PARTIAL: CPT

## 2020-07-11 RX ADMIN — ZINC SULFATE 220 MG (50 MG) CAPSULE 1 CAPSULE: CAPSULE at 09:19

## 2020-07-11 RX ADMIN — PANTOPRAZOLE SODIUM 40 MG: 40 TABLET, DELAYED RELEASE ORAL at 09:19

## 2020-07-11 RX ADMIN — HEPARIN SODIUM 15 UNITS/KG/HR: 10000 INJECTION, SOLUTION INTRAVENOUS at 17:22

## 2020-07-11 RX ADMIN — Medication 5 MG: at 21:01

## 2020-07-11 RX ADMIN — CHOLECALCIFEROL TAB 10 MCG (400 UNIT) 5 TABLET: 10 TAB at 09:18

## 2020-07-11 RX ADMIN — Medication 500 MG: at 09:19

## 2020-07-11 NOTE — PROGRESS NOTES
Hospitalist Progress Note    Patient: Juan C Jacob MRN: [id number]  CSN: [id number]    YOB: 1955  Age: 72 y.o. Sex: male    DOA: 7/9/2020 LOS:  LOS: 2 days              IMPRESSION and Plan:    Juan C Jacob is a 72 y.o. male with   Patient Active Problem List    Diagnosis Date Noted    Acute pulmonary embolism with acute cor pulmonale ([address] 07/09/2020    Sinus tachycardia 07/09/2020    SOB (shortness of breath) 07/09/2020     Principal Problem:    Acute pulmonary embolism with acute cor pulmonale ([address] (7/9/2020)    Active Problems:    Sinus tachycardia (7/9/2020)      SOB (shortness of breath) (7/9/2020)        72 y. o. male who has past medical history of prostate cancer, H pylori, DVT, peptic ulcer disease presents to ER with concerns of shortness of breath.           Acute PE with acute cor pulmonale -  Cont Iv hep. Will chage to NOAC prior to dc  Echo and LE doppler pending Encourage incentive spirometry     Suspected COVID-19- COVID X 1 negattive. Will do second one given prob is high     Cont on vitamin C, vitamin D3, zinc, melatonin.       Disposition : 1-2 days    Patient's condition is fair        Recommend to continue hospitalization. Discussed with patient. Chief Complaints:   Chief Complaint   Patient presents with    Shortness of Breath     SUBJECTIVE:  Pt is seen and examined. Resting comfortably. Review of systems:    Review of Systems   Constitutional: Positive for malaise/fatigue. HENT: Negative. Eyes: Negative. Respiratory: Positive for shortness of breath. Cardiovascular: Positive for leg swelling. Negative for chest pain, palpitations and orthopnea. Gastrointestinal: Negative. Negative for abdominal pain, diarrhea and heartburn. Genitourinary: Negative for dysuria and hematuria. Skin: Negative. Neurological: Positive for weakness. Psychiatric/Behavioral: Negative for depression, substance abuse and suicidal ideas.  The patient is not nervous/anxious. PE:  Patient Vitals for the past 24 hrs:   BP Temp Pulse Resp SpO2   07/11/20 0752 127/86 98.2 °F (36.8 °C) 84 17 96 %   07/11/20 0611 (!) 129/91 98.4 °F (36.9 °C) 88 16 93 %   07/11/20 0324 128/87 98.6 °F (37 °C) 85 16 94 %   07/10/20 2306 (!) 121/93 98.1 °F (36.7 °C) 92 16 94 %   07/10/20 1651 134/90 98.5 °F (36.9 °C) 93 16 97 %   07/10/20 1219 121/75 97.8 °F (36.6 °C) 89 16 97 %       Intake/Output Summary (Last 24 hours) at 7/11/2020 1140  Last data filed at 7/11/2020 3970  Gross per 24 hour   Intake 260 ml   Output 1250 ml   Net -990 ml     Patient Vitals for the past 120 hrs:   Weight   07/09/20 1134 106.6 kg (235 lb)         Physical Exam  Vitals signs and nursing note reviewed. Constitutional:       General: He is in acute distress. Appearance: He is ill-appearing. Neck:      Musculoskeletal: Normal range of motion and neck supple. Vascular: No JVD. Cardiovascular:      Rate and Rhythm: Normal rate and regular rhythm. Heart sounds: Normal heart sounds. Pulmonary:      Effort: Pulmonary effort is normal.      Breath sounds: Normal breath sounds. No rhonchi. Abdominal:      General: Bowel sounds are normal. There is no distension. Palpations: Abdomen is soft. Tenderness: There is no abdominal tenderness. There is no rebound. Musculoskeletal: Normal range of motion. Skin:     General: Skin is warm and dry. Neurological:      Mental Status: He is alert and oriented to person, place, and time.    Psychiatric:         Mood and Affect: Mood and affect normal.             Intake and Output:  Current Shift:  07/11 0701 - 07/11 1900  In: -   Out: 300 [Urine:300]  Last three shifts:  07/09 1901 - 07/11 0700  In: 500 [P.O.:500]  Out: 1250 [Urine:1250]    Lab/Data Reviewed:  Recent Results (from the past 8 hour(s))   SARS-COV-2    Collection Time: 07/11/20 11:00 AM   Result Value Ref Range    SARS-CoV-2 PENDING     Specimen source Nasopharyngeal Medications:  Current Facility-Administered Medications   Medication Dose Route Frequency    heparin 25,000 units in D5W 250 ml infusion  18-36 Units/kg/hr IntraVENous TITRATE    ascorbic acid (vitamin C) (VITAMIN C) tablet 500 mg  500 mg Oral DAILY    cholecalciferol (VITAMIN D3) (400 Units /10 mcg) tablet 5 Tab  2,000 Units Oral DAILY    zinc sulfate (ZINCATE) 220 (50) mg capsule 1 Cap  1 Cap Oral DAILY    melatonin tablet 5 mg  5 mg Oral QHS    pantoprazole (PROTONIX) tablet 40 mg  40 mg Oral DAILY       Recent Results (from the past 24 hour(s))   PTT    Collection Time: 07/11/20  3:00 AM   Result Value Ref Range    aPTT 74.8 (H) 23.0 - 98.7 SEC   METABOLIC PANEL, BASIC    Collection Time: 07/11/20  3:00 AM   Result Value Ref Range    Sodium 138 136 - 145 mmol/L    Potassium 4.3 3.5 - 5.5 mmol/L    Chloride 107 100 - 111 mmol/L    CO2 24 21 - 32 mmol/L    Anion gap 7 3.0 - 18 mmol/L    Glucose 104 (H) 74 - 99 mg/dL    BUN 15 7.0 - 18 MG/DL    Creatinine 0.96 0.6 - 1.3 MG/DL    BUN/Creatinine ratio 16 12 - 20      GFR est AA >60 >60 ml/min/1.73m2    GFR est non-AA >60 >60 ml/min/1.73m2    Calcium 8.1 (L) 8.5 - 10.1 MG/DL   CBC W/O DIFF    Collection Time: 07/11/20  3:00 AM   Result Value Ref Range    WBC 9.0 4.6 - 13.2 K/uL    RBC 5.41 4.70 - 5.50 M/uL    HGB 16.7 (H) 13.0 - 16.0 g/dL    HCT 50.4 (H) 36.0 - 48.0 %    MCV 93.2 74.0 - 97.0 FL    MCH 30.9 24.0 - 34.0 PG    MCHC 33.1 31.0 - 37.0 g/dL    RDW 13.9 11.6 - 14.5 %    PLATELET 762 091 - 312 K/uL    MPV 10.8 9.2 - 11.8 FL   SARS-COV-2    Collection Time: 07/11/20 11:00 AM   Result Value Ref Range    SARS-CoV-2 PENDING     Specimen source Nasopharyngeal         Procedures/imaging: see electronic medical records for all procedures/Xrays and details which were not copied into this note but were reviewed prior to creation of Elena Lowry MD   7/11/2020, 11:40 AM

## 2020-07-11 NOTE — PROGRESS NOTES
Problem: Falls - Risk of  Goal: *Absence of Falls  Description: Document Todd Moseley Fall Risk and appropriate interventions in the flowsheet.   Outcome: Progressing Towards Goal  Note: Fall Risk Interventions:            Medication Interventions: Patient to call before getting OOB, Teach patient to arise slowly                   Problem: Pulmonary Embolism Care Plan (Adult)  Goal: *Improvement of existing pulmonary embolism  Outcome: Progressing Towards Goal  Note: Pt on heparin to decrease size of clot  Goal: *Absence of bleeding  Outcome: Progressing Towards Goal  Goal: *Labs within defined limits  Outcome: Progressing Towards Goal

## 2020-07-11 NOTE — PROGRESS NOTES
Problem: Falls - Risk of  Goal: *Absence of Falls  Description: Document Bibi Dennis Fall Risk and appropriate interventions in the flowsheet.   Outcome: Progressing Towards Goal  Note: Fall Risk Interventions:            Medication Interventions: Patient to call before getting OOB, Teach patient to arise slowly                   Problem: Patient Education: Go to Patient Education Activity  Goal: Patient/Family Education  Outcome: Progressing Towards Goal

## 2020-07-11 NOTE — PROGRESS NOTES
Chart reviewed as CM on call. Pt admitted to tele by hospitalist.  Pt noted to have one neg covid test on 7/9, one pending covid test 7/11. Called and spoke with pt on phone regarding dc plan. CM role explained. Pt states he lives on a boat with his wife currently. The [place] address on file is his mailing address. May need local PCP referral.  Pt independent, denies using DME or [facility] services. Wife will  at discharge. No dc concerns identified. CM will cont to follow and will be available via hospital  on weekends.

## 2020-07-11 NOTE — PROGRESS NOTES
1915  Assumed care of pt   2306  Shift assessment complete   0324  Reassessment complete   0430  Pt covid test back negative - hospitalist paged to alert   [phone]  Reassessment complete     Shift uneventful     Bedside and Verbal shift change report given to Mauricio Garcia (oncoming nurse) by Wing Xiong (offgoing nurse). Report included the following information SBAR, Kardex, ED Summary, Intake/Output, MAR, Recent Results, Med Rec Status and Cardiac Rhythm NSR.

## 2020-07-11 NOTE — PROGRESS NOTES
0725 Bedside and Verbal shift change report given to GIGI Prado RN (oncoming nurse) by Nyla Orr RN (offgoing nurse). Report included the following information SBAR, Kardex, Intake/Output, and MAR. Pt in bed, call light in reach. Heparin verified. 6680 Pt assessed. No signs of acute distress. Pt in bed.    1100 Pt reswabbed for covid-19.     1227 Pt assessed. No signs of acute distress. Pt in bed.    1701 Pt assessed. No signs of acute distress. Pt in bed. 1730 New Heparin bag hung. 1900 Bedside and Verbal shift change report given to Nyla Orr, VASILE (oncoming nurse) by Tressa Pond. Florence Prado RN (offgoing nurse). Report included the following information SBAR, Kardex, Intake/Output, and MAR. Pt in bed, call light in reach. Shift uneventful.

## 2020-07-11 NOTE — PROGRESS NOTES
[facility]                  Pulmonary, Critical Care, and Sleep Medicine     Name: Manuel Stern MRN: [id number]   : 1955 Hospital: [facility]    Date: 2020        [facility] Note                                              Consult requesting physician: Dr. Modesta Valentino, Dr. My Dee  Reason for Consult: PE      IMPRESSION:   Acute pulmonary embolism with acute cor pulmonale (bilateral extensive lobar and subsegmental PEs with RV strain. Hx PE/DVT after knee surgery in about ; lives on a boat since > 2 years; RV strain; d-dimer 5.38; BNP 6979; troponin 0 0.04). I26.09 ·     · 2 mm RUL pulmonary nodule. ·   Patient Active Problem List   Diagnosis Code    Acute pulmonary embolism with acute cor pulmonale (HCC) I26.09    Sinus tachycardia R00.0    SOB (shortness of breath) R06.02           RECOMMENDATIONS:   Continue heparin drip. Patient lives on a boat and so frequent INR check would be problematic and so patient agrees to go on newer oral anticoagulants. Can transition from heparin to newer oral anticoagulants. On room air resting. Echo and PVL LE pending. COVID19 negative (ordered due to mild nonspecific GGO in RUL); second COVID19 pending. Low probability for COVID19 pneumonia. Recommend outpatient hematology work-up. Consider outpatient pulmonary nodule evaluation. FiO2 to keep SpO2 >=92%, HOB >=30 degree, aspiration precautions, aggressive pulmonary toileting, incentive spirometry, PT/OT eval and treat, mobilization. Other issues management by primary team and respective consultants. Further recommendations will be based on the patient's response to recommended treatment and results of the investigation ordered. Quality Care: PPI, after knee surgery in the past, prophylaxis, HOB elevated, infection control all reviewed and addressed. Discussed with patient radiologic work up showed, answered all questions to their satisfaction.    Care plan discussed with nursing, RN. Subjective/History:     Randee Montes is a 72 y.o. male with PMHx significant for DVT/PE after knee surgery in about 2012, peptic ulcer disease, who came to ER with shortness of breath since 2.5 months off and on, diagnosed with mild nonspecific peripheral GGO RUL with extensive bilateral lobar and segmental pulmonary emboli with right heart strain. Patient lives on a boat since more than 2 years. Denies recent surgery, hospitalization, acute illness, long car or airline travel. 7/11/2020   On RA resting  COVID 19 negative; second COVID19 to be sent today  Tolerating heparin drip without any bleeding. No cough fever leg edema cp  Dyspnea improving  No other pulmonary issues reported. Review of Systems:   HEENT: No epistaxis, no nasal drainage, no difficulty in swallowing, no redness in eyes  Respiratory: as above  Cardiovascular: no chest pain, no palpitations, no chronic leg edema, no syncope  Gastrointestinal: no abd pain, no vomiting, no diarrhea, no bleeding symptoms  Genitourinary: No urinary symptoms or hematuria  Integument/breast: No ulcers or rashes  Musculoskeletal:Neg  Neurological: No focal weakness, no seizures, no headaches  Behvioral/Psych: No anxiety, no depression  Constitutional: No fever, no chills, no weight loss, no night sweats       No Known Allergies     Past Medical History:   Diagnosis Date    DVT of leg (deep venous thrombosis) (HCC)     H pylori ulcer     PE (pulmonary thromboembolism) (HCC)     Prostate CA ([address]         History reviewed. No pertinent surgical history. History reviewed. No pertinent family history.      Social History     Tobacco Use    Smoking status: Never Smoker    Smokeless tobacco: Never Used   Substance Use Topics    Alcohol use: Yes        Prior to Admission medications    Not on File       Current Facility-Administered Medications   Medication Dose Route Frequency    heparin 25,000 units in D5W 250 ml infusion  18-36 Units/kg/hr IntraVENous TITRATE    ascorbic acid (vitamin C) (VITAMIN C) tablet 500 mg  500 mg Oral DAILY    cholecalciferol (VITAMIN D3) (400 Units /10 mcg) tablet 5 Tab  2,000 Units Oral DAILY    zinc sulfate (ZINCATE) 220 (50) mg capsule 1 Cap  1 Cap Oral DAILY    melatonin tablet 5 mg  5 mg Oral QHS    pantoprazole (PROTONIX) tablet 40 mg  40 mg Oral DAILY         Objective:   Vital Signs:    Visit Vitals  /86 (BP 1 Location: Left arm, BP Patient Position: At rest)   Pulse 84   Temp 98.2 °F (36.8 °C)   Resp 17   Ht 6' 2\" (1.88 m)   Wt 106.6 kg (235 lb)   SpO2 96%   BMI 30.17 kg/m²       O2 Device: Room air       Temp (24hrs), Av.3 °F (36.8 °C), Min:97.8 °F (36.6 °C), Max:98.6 °F (37 °C)       Intake/Output:   Last shift:      701 - 1900  In: -   Out: 300 [Urine:300]  Last 3 shifts: 1901 -  0700  In: 500 [P.O.:500]  Out: 1250 [Urine:1250]    Intake/Output Summary (Last 24 hours) at 2020 1040  Last data filed at 2020 0752  Gross per 24 hour   Intake 260 ml   Output 1250 ml   Net -990 ml       Physical Exam:     General/Neurology: Alert, Awake, NAD. Head:   Normocephalic, without obvious abnormality, atraumatic. Neck:   Supple, symmetric. No carotid bruit. No lymphadenopathy. Trachea midline  Lung:   Good air entry bilateral equal. No rales. No rhonchi. No wheezing. No stridors. No prolongded expiration. No accessory muscle use. Heart:   Regular rate & rhythm. S1 S2 present. No murmur. No JVD. Abdomen:  Soft. NT. ND. +BS. No masses. Extremities:  No pedal edema. No cyanosis. No clubbing. Pulses: 2+ and symmetric in DP. Capillary refill: normal.   Lymphatic:  No cervical or supraclavicular palpable lymphadenopathy.        Data:       Recent Results (from the past 24 hour(s))   PTT    Collection Time: 20  3:00 AM   Result Value Ref Range    aPTT 74.8 (H) 23.0 - 71.7 SEC   METABOLIC PANEL, BASIC    Collection Time: 20  3:00 AM Result Value Ref Range    Sodium 138 136 - 145 mmol/L    Potassium 4.3 3.5 - 5.5 mmol/L    Chloride 107 100 - 111 mmol/L    CO2 24 21 - 32 mmol/L    Anion gap 7 3.0 - 18 mmol/L    Glucose 104 (H) 74 - 99 mg/dL    BUN 15 7.0 - 18 MG/DL    Creatinine 0.96 0.6 - 1.3 MG/DL    BUN/Creatinine ratio 16 12 - 20      GFR est AA >60 >60 ml/min/1.73m2    GFR est non-AA >60 >60 ml/min/1.73m2    Calcium 8.1 (L) 8.5 - 10.1 MG/DL   CBC W/O DIFF    Collection Time: 07/11/20  3:00 AM   Result Value Ref Range    WBC 9.0 4.6 - 13.2 K/uL    RBC 5.41 4.70 - 5.50 M/uL    HGB 16.7 (H) 13.0 - 16.0 g/dL    HCT 50.4 (H) 36.0 - 48.0 %    MCV 93.2 74.0 - 97.0 FL    MCH 30.9 24.0 - 34.0 PG    MCHC 33.1 31.0 - 37.0 g/dL    RDW 13.9 11.6 - 14.5 %    PLATELET 612 681 - 225 K/uL    MPV 10.8 9.2 - 11.8 FL         Chemistry Recent Labs     07/11/20  0300 07/09/20  1147   * 94    138   K 4.3 4.8    107   CO2 24 26   BUN 15 21*   CREA 0.96 1.22   CA 8.1* 9.1   MG  --  2.4   AGAP 7 5   BUCR 16 17   AP  --  93   TP  --  7.3   ALB  --  3.7   GLOB  --  3.6   AGRAT  --  1.0        Lactic Acid No results found for: LAC  No results for input(s): LAC in the last 72 hours. Liver Enzymes Protein, total   Date Value Ref Range Status   07/09/2020 7.3 6.4 - 8.2 g/dL Final     Albumin   Date Value Ref Range Status   07/09/2020 3.7 3.4 - 5.0 g/dL Final     Globulin   Date Value Ref Range Status   07/09/2020 3.6 2.0 - 4.0 g/dL Final     A-G Ratio   Date Value Ref Range Status   07/09/2020 1.0 0.8 - 1.7   Final     Alk.  phosphatase   Date Value Ref Range Status   07/09/2020 93 45 - 117 U/L Final     Recent Labs     07/09/20  1147   TP 7.3   ALB 3.7   GLOB 3.6   AGRAT 1.0   AP 93        CBC w/Diff Recent Labs     07/11/20  0300 07/10/20  0635 07/09/20  1147   WBC 9.0 9.4 12.6   RBC 5.41 5.31 5.65*   HGB 16.7* 16.5* 17.9*   HCT 50.4* 49.6* 53.0*    149 169   GRANS  --  68 77*   LYMPH  --  22 14*   EOS  --  1 0        Cardiac Enzymes No results found for: CPK, CK, CKMMB, CKMB, RCK3, CKMBT, CKNDX, CKND1, ANJEL, TROPT, TROIQ, RIVER, TROPT, TNIPOC, BNP, BNPP     BNP No results found for: BNP, BNPP, XBNPT     Coagulation Recent Labs     07/11/20  0300 07/10/20  0635 07/09/20  2125   APTT 74.8* 83.4* 151.1*         Thyroid  No results found for: T4, T3U, TSH, TSHEXT, TSHEXT    No results found for: T4     Urinalysis No results found for: COLOR, APPRN, SPGRU, REFSG, PIYUSH, PROTU, GLUCU, KETU, BILU, UROU, ARACELY, LEUKU, GLUKE, EPSU, BACTU, WBCU, RBCU, CASTS, UCRY     Micro  No results for input(s): SDES, CULT in the last 72 hours. No results for input(s): CULT in the last 72 hours. ABG No results for input(s): PHI, PHI, POC2, PCO2I, PO2, PO2I, HCO3, HCO3I, FIO2, FIO2I in the last 72 hours. CT (Most Recent) (CT chest reviewed by me) Results from Hospital Encounter encounter on 07/09/20   CTA CHEST W OR W WO CONT    Narrative EXAM: CTA Chest    CLINICAL INDICATION/HISTORY: Shortness of breath, tachycardia. Possible PE.    COMPARISON: Plain film chest same day    TECHNIQUE: Axial CT imaging from the thoracic inlet through the diaphragm with  intravenous contrast utilizing CTA study for pulmonary artery evaluation. Coronal and sagittal MIP reformations were generated at a separate workstation. One or more dose reduction techniques were used on this CT: automated exposure  control, adjustment of the mAs and/or kVp according to patient's size, and  iterative reconstruction techniques. The specific techniques utilized on this CT  exam have been documented in the patient's electronic medical record. Digital  imaging and communications and medicine (DICOM) format image data are available  to nonaffiliated external healthcare facilities or entities on a secure, media  free, reciprocally searchable basis with patient authorization for at least a 12  month period after this study.       _______________    FINDINGS:    EXAM QUALITY: Overall exam quality is adequate. Pulmonary arterial enhancement  is adequate. The breath hold is satisfactory. PULMONARY ARTERIES: There is extensive pulmonary emboli, with complete occlusion  of the proximal left lower lobe artery with no contrast seen more distally  within the left lower lobe peripheral vessels. There is minimal eccentric clot  within proximal lingular segment. There is some partial nonocclusive clot within  anterior segment left upper lobe artery. There is complete occlusion of the  proximal right upper lobe artery with absent contrast seen more peripherally in  the right upper lobe vessels. Partial nonocclusive clot seen within the proximal  middle lobe arteries. There is occlusive clot involving proximal lateral  segmental right lower lobe artery. No filling defect within the main pulmonary  arteries. MEDIASTINUM: There is evidence of right heart strain with RV/LV ratio greater  than 1, right ventricle measuring 5.5 cm and left ventricle measuring 3.9 cm. There is mild associated septal bowing and some reflux of contrast into the IVC  and hepatic veins. There is also prominence of the azygos vein. Normal heart  size with no pericardial effusion. Coronary artery calcifications. Thoracic  aorta unremarkable. LYMPH NODES: No enlarged mediastinal or hilar nodes by size criteria. AIRWAY: Unremarkable. LUNGS: There is 2 mm diameter parenchymal nodule within the posterior right  upper lobe, axial image #30. Mild peripheral groundglass density lateral and  posterior lateral right upper lobe. No other area of consolidation or mass. PLEURA: No pleural effusion or pneumothorax. UPPER ABDOMEN: Visualized upper abdomen is unremarkable. .    OTHER: Degenerative lower cervical and thoracic spondylosis with no acute or  aggressive osseous findings. Proximal right humeral prosthesis noted. _______________      Impression IMPRESSION:    1.   Extensive bilateral lobar and segmental pulmonary emboli as detailed above,  with evidence of right heart strain. This was discussed with Dr. Giuseppe Corona at  1:21 PM.    2. Mild nonspecific peripheral groundglass density right upper lobe. 3. 2 mm right upper lobe nodule nonspecific but likely benign. 2.            XR (Most Recent). CXR  reviewed by me and compared with previous CXR Results from Hospital Encounter encounter on 07/09/20   XR CHEST PORT    Narrative EXAM: XR CHEST PORT    CLINICAL INDICATION/HISTORY: sob, tachycardia  -Additional: None    COMPARISON: None    TECHNIQUE: Frontal view of the chest    _______________    FINDINGS:    HEART AND MEDIASTINUM: Midline cardiac silhouette, normal in size. Unremarkable  hilar vascular structures. LUNGS AND PLEURAL SPACES: No focal consolidation, parenchymal opacity. No  pneumothorax or pleural effusion. BONY THORAX AND SOFT TISSUES: Total right shoulder arthroplasty. Moderate  appearing Left glenohumeral DJD. Otherwise, no acute osseous abnormality    _______________      Impression IMPRESSION:    1. No acute cardiopulmonary process.              Eva Bernard MD  7/11/2020

## 2020-07-12 ENCOUNTER — APPOINTMENT (OUTPATIENT)
Dept: NON INVASIVE DIAGNOSTICS | Age: 65
DRG: 175 | End: 2020-07-12
Attending: INTERNAL MEDICINE
Payer: MEDICARE

## 2020-07-12 ENCOUNTER — APPOINTMENT (OUTPATIENT)
Dept: VASCULAR SURGERY | Age: 65
DRG: 175 | End: 2020-07-12
Attending: INTERNAL MEDICINE
Payer: MEDICARE

## 2020-07-12 LAB
ALBUMIN SERPL-MCNC: 3.2 G/DL (ref 3.4–5)
ALBUMIN/GLOB SERPL: 0.9 {RATIO} (ref 0.8–1.7)
ALP SERPL-CCNC: 104 U/L (ref 45–117)
ALT SERPL-CCNC: 92 U/L (ref 16–61)
ANION GAP SERPL CALC-SCNC: 6 MMOL/L (ref 3–18)
APTT PPP: 69.6 SEC (ref 23–36.4)
APTT PPP: 81.8 SEC (ref 23–36.4)
AST SERPL-CCNC: 40 U/L (ref 10–38)
AV VELOCITY RATIO: 1.01
AV VTI RATIO: 1
BASOPHILS # BLD: 0 K/UL (ref 0–0.1)
BASOPHILS NFR BLD: 0 % (ref 0–2)
BILIRUB SERPL-MCNC: 1.2 MG/DL (ref 0.2–1)
BUN SERPL-MCNC: 15 MG/DL (ref 7–18)
BUN/CREAT SERPL: 15 (ref 12–20)
CALCIUM SERPL-MCNC: 8.5 MG/DL (ref 8.5–10.1)
CHLORIDE SERPL-SCNC: 106 MMOL/L (ref 100–111)
CO2 SERPL-SCNC: 27 MMOL/L (ref 21–32)
CREAT SERPL-MCNC: 0.98 MG/DL (ref 0.6–1.3)
DIFFERENTIAL METHOD BLD: ABNORMAL
ECHO AV ANNULUS DIAM: 3.66 CM
ECHO AV AREA PEAK VELOCITY: 3.1 CM2
ECHO AV AREA VTI: 3 CM2
ECHO AV AREA/BSA PEAK VELOCITY: 1.4 CM2/M2
ECHO AV AREA/BSA VTI: 1.3 CM2/M2
ECHO AV MEAN GRADIENT: 3.4 MMHG
ECHO AV MEAN VELOCITY: 0.9 M/S
ECHO AV PEAK GRADIENT: 5 MMHG
ECHO AV PEAK VELOCITY: 111.57 CM/S
ECHO AV VTI: 20.4 CM
ECHO EST RA PRESSURE: 10 MMHG
ECHO IVC PROX: 2.41 CM
ECHO LA AREA 2C: 16.31 CM2
ECHO LA AREA 4C: 15.3 CM2
ECHO LA MAJOR AXIS: 3.87 CM
ECHO LA MINOR AXIS: 1.7 CM
ECHO LA VOL 2C: 36.48 ML (ref 18–58)
ECHO LA VOL 4C: 29.5 ML (ref 18–58)
ECHO LA VOL BP: 36.27 ML (ref 18–58)
ECHO LA VOLUME INDEX A2C: 15.99 ML/M2 (ref 16–28)
ECHO LA VOLUME INDEX A4C: 12.93 ML/M2 (ref 16–28)
ECHO LV E' LATERAL VELOCITY: 18 CM/S
ECHO LV E' SEPTAL VELOCITY: 8 CM/S
ECHO LV EDV A2C: 89.6 ML
ECHO LV EDV A4C: 57.7 ML
ECHO LV EDV BP: 71.4 ML (ref 67–155)
ECHO LV EDV INDEX A4C: 25.3 ML/M2
ECHO LV EDV INDEX BP: 31.3 ML/M2
ECHO LV EDV NDEX A2C: 39.3 ML/M2
ECHO LV EDV TEICHHOLZ: 0.36 ML
ECHO LV EJECTION FRACTION A2C: 54 %
ECHO LV EJECTION FRACTION A4C: 56 %
ECHO LV EJECTION FRACTION BIPLANE: 51.4 % (ref 55–100)
ECHO LV ESV A2C: 41.1 ML
ECHO LV ESV A4C: 25.5 ML
ECHO LV ESV BP: 34.7 ML (ref 22–58)
ECHO LV ESV INDEX A2C: 18 ML/M2
ECHO LV ESV INDEX A4C: 11.2 ML/M2
ECHO LV ESV INDEX BP: 15.2 ML/M2
ECHO LV ESV TEICHHOLZ: 0.11 ML
ECHO LV INTERNAL DIMENSION DIASTOLIC: 3.88 CM (ref 4.2–5.9)
ECHO LV INTERNAL DIMENSION SYSTOLIC: 2.41 CM
ECHO LV IVSD: 1.25 CM (ref 0.6–1)
ECHO LV MASS 2D: 163 G (ref 88–224)
ECHO LV MASS INDEX 2D: 71.5 G/M2 (ref 49–115)
ECHO LV POSTERIOR WALL DIASTOLIC: 1.2 CM (ref 0.6–1)
ECHO LVOT CARDIAC OUTPUT: 5.04 L/MIN
ECHO LVOT DIAM: 1.99 CM
ECHO LVOT PEAK GRADIENT: 5.1 MMHG
ECHO LVOT PEAK VELOCITY: 112.41 CM/S
ECHO LVOT SV: 62.1 ML
ECHO LVOT VTI: 20.03 CM
ECHO MV A VELOCITY: 66.12 CM/S
ECHO MV AREA PHT: 5 CM2
ECHO MV E DECELERATION TIME (DT): 152.2 MS
ECHO MV E VELOCITY: 51.67 CM/S
ECHO MV E/A RATIO: 0.78
ECHO MV E/E' LATERAL: 2.87
ECHO MV E/E' RATIO (AVERAGED): 4.66
ECHO MV E/E' SEPTAL: 6.46
ECHO MV PRESSURE HALF TIME (PHT): 44.1 MS
ECHO PULMONARY ARTERY SYSTOLIC PRESSURE (PASP): 54.9 MMHG
ECHO PV REGURGITANT MAX VELOCITY: 83 CM/S
ECHO RA AREA 4C: 20.72 CM2
ECHO RA VOLUME: 57.9 ML
ECHO RIGHT VENTRICULAR SYSTOLIC PRESSURE (RVSP): 54.9 MMHG
ECHO RV INTERNAL DIMENSION: 3.83 CM
ECHO RV TAPSE: 1.62 CM (ref 1.5–2)
ECHO TV REGURGITANT MAX VELOCITY: 335.05 CM/S
ECHO TV REGURGITANT PEAK GRADIENT: 44.9 MMHG
EOSINOPHIL # BLD: 0.1 K/UL (ref 0–0.4)
EOSINOPHIL NFR BLD: 2 % (ref 0–5)
ERYTHROCYTE [DISTWIDTH] IN BLOOD BY AUTOMATED COUNT: 13.8 % (ref 11.6–14.5)
GLOBULIN SER CALC-MCNC: 3.6 G/DL (ref 2–4)
GLUCOSE SERPL-MCNC: 90 MG/DL (ref 74–99)
HCT VFR BLD AUTO: 52.7 % (ref 36–48)
HGB BLD-MCNC: 17.5 G/DL (ref 13–16)
LVFS 2D: 37.84 %
LVOT MG: 2.99 MMHG
LVOT MV: 0.82 CM/S
LVSV (MOD BI): 15.94 ML
LVSV (MOD SINGLE 4C): 13.97 ML
LVSV (MOD SINGLE): 21.09 ML
LVSV (TEICH): 19.4 ML
LYMPHOCYTES # BLD: 2.2 K/UL (ref 0.9–3.6)
LYMPHOCYTES NFR BLD: 27 % (ref 21–52)
MAGNESIUM SERPL-MCNC: 2.4 MG/DL (ref 1.6–2.6)
MCH RBC QN AUTO: 31.3 PG (ref 24–34)
MCHC RBC AUTO-ENTMCNC: 33.2 G/DL (ref 31–37)
MCV RBC AUTO: 94.1 FL (ref 74–97)
MONOCYTES # BLD: 0.5 K/UL (ref 0.05–1.2)
MONOCYTES NFR BLD: 6 % (ref 3–10)
MV DEC SLOPE: 3.4
NEUTS SEG # BLD: 5.5 K/UL (ref 1.8–8)
NEUTS SEG NFR BLD: 65 % (ref 40–73)
PHOSPHATE SERPL-MCNC: 3.4 MG/DL (ref 2.5–4.9)
PLATELET # BLD AUTO: 195 K/UL (ref 135–420)
PMV BLD AUTO: 11.3 FL (ref 9.2–11.8)
POTASSIUM SERPL-SCNC: 4.6 MMOL/L (ref 3.5–5.5)
PROT SERPL-MCNC: 6.8 G/DL (ref 6.4–8.2)
PULMONARY ARTERY END DIASTOLIC PRESSURE: 12.7 MMHG
PULMONARY ARTERY MEAN PRESURE: 26.8 MMHG
PV END DIASTOLIC VELOCITY: 0.8 MMHG
RBC # BLD AUTO: 5.6 M/UL (ref 4.7–5.5)
SODIUM SERPL-SCNC: 139 MMOL/L (ref 136–145)
WBC # BLD AUTO: 8.4 K/UL (ref 4.6–13.2)

## 2020-07-12 PROCEDURE — 74011250636 HC RX REV CODE- 250/636: Performed by: HOSPITALIST

## 2020-07-12 PROCEDURE — 85730 THROMBOPLASTIN TIME PARTIAL: CPT

## 2020-07-12 PROCEDURE — 85025 COMPLETE CBC W/AUTO DIFF WBC: CPT

## 2020-07-12 PROCEDURE — 80053 COMPREHEN METABOLIC PANEL: CPT

## 2020-07-12 PROCEDURE — 83735 ASSAY OF MAGNESIUM: CPT

## 2020-07-12 PROCEDURE — 74011250636 HC RX REV CODE- 250/636: Performed by: EMERGENCY MEDICINE

## 2020-07-12 PROCEDURE — 84100 ASSAY OF PHOSPHORUS: CPT

## 2020-07-12 PROCEDURE — 93970 EXTREMITY STUDY: CPT

## 2020-07-12 PROCEDURE — 74011250637 HC RX REV CODE- 250/637: Performed by: HOSPITALIST

## 2020-07-12 PROCEDURE — 65660000000 HC RM CCU STEPDOWN

## 2020-07-12 PROCEDURE — 93306 TTE W/DOPPLER COMPLETE: CPT

## 2020-07-12 RX ORDER — HEPARIN SODIUM 1000 [USP'U]/ML
40 INJECTION, SOLUTION INTRAVENOUS; SUBCUTANEOUS ONCE
Status: COMPLETED | OUTPATIENT
Start: 2020-07-12 | End: 2020-07-12

## 2020-07-12 RX ADMIN — Medication 500 MG: at 08:38

## 2020-07-12 RX ADMIN — PANTOPRAZOLE SODIUM 40 MG: 40 TABLET, DELAYED RELEASE ORAL at 08:38

## 2020-07-12 RX ADMIN — HEPARIN SODIUM 17 UNITS/KG/HR: 10000 INJECTION, SOLUTION INTRAVENOUS at 13:29

## 2020-07-12 RX ADMIN — CHOLECALCIFEROL TAB 10 MCG (400 UNIT) 5 TABLET: 10 TAB at 08:38

## 2020-07-12 RX ADMIN — Medication 5 MG: at 22:22

## 2020-07-12 RX ADMIN — HEPARIN SODIUM 4260 UNITS: 1000 INJECTION INTRAVENOUS; SUBCUTANEOUS at 06:04

## 2020-07-12 RX ADMIN — ZINC SULFATE 220 MG (50 MG) CAPSULE 1 CAPSULE: CAPSULE at 08:38

## 2020-07-12 NOTE — PROGRESS NOTES
Hospitalist Progress Note    Patient: Dennard Schlatter MRN: [id number]  CSN: [id number]    YOB: 1955  Age: 72 y.o. Sex: male    DOA: 7/9/2020 LOS:  LOS: 3 days              IMPRESSION and Plan:    Dennard Schlatter is a 72 y.o. male with   Patient Active Problem List    Diagnosis Date Noted    Acute pulmonary embolism with acute cor pulmonale ([address] 07/09/2020    Sinus tachycardia 07/09/2020    SOB (shortness of breath) 07/09/2020     Principal Problem:    Acute pulmonary embolism with acute cor pulmonale ([address] (7/9/2020)    Active Problems:    Sinus tachycardia (7/9/2020)      SOB (shortness of breath) (7/9/2020)        72 y. o. male who has past medical history of prostate cancer, H pylori, DVT, peptic ulcer disease presents to ER with concerns of shortness of breath.           Acute PE with acute cor pulmonale -  Cont Iv hep. Will chage to NOAC prior to dc  Echo and LE doppler pending Encourage incentive spirometry     Suspected COVID-19- COVID X 1 negattive. Second COVID pending    Cont on vitamin C, vitamin D3, zinc, melatonin.       Disposition : 1-2 days    Patient's condition is fair        Recommend to continue hospitalization. Discussed with patient. Chief Complaints:   Chief Complaint   Patient presents with    Shortness of Breath     SUBJECTIVE:  Pt is seen and examined. Still co significant sob on exertion but states \"tiny bit better\"  No f/c/n/v        Review of systems:    Review of Systems   Constitutional: Positive for malaise/fatigue. HENT: Negative. Eyes: Negative. Respiratory: Positive for shortness of breath. Cardiovascular: Positive for leg swelling. Negative for chest pain, palpitations and orthopnea. Gastrointestinal: Negative. Negative for abdominal pain, diarrhea and heartburn. Genitourinary: Negative for dysuria and hematuria. Skin: Negative. Neurological: Positive for weakness.    Psychiatric/Behavioral: Negative for depression, substance abuse and suicidal ideas. The patient is not nervous/anxious. PE:  Patient Vitals for the past 24 hrs:   BP Temp Pulse Resp SpO2   07/12/20 0837 141/82 98.7 °F (37.1 °C) 74 17 96 %   07/12/20 0319 (!) 142/92 98.4 °F (36.9 °C) 87 16 97 %   07/11/20 2349 131/88 98.4 °F (36.9 °C) 86 16 94 %   07/11/20 2055 124/83 98.4 °F (36.9 °C) 88 16 95 %   07/11/20 1701 132/90 98.7 °F (37.1 °C) 91 16 96 %   07/11/20 1227 125/88 97.8 °F (36.6 °C) 83 18 96 %       Intake/Output Summary (Last 24 hours) at 7/12/2020 1129  Last data filed at 7/12/2020 0320  Gross per 24 hour   Intake    Output 1225 ml   Net -1225 ml     Patient Vitals for the past 120 hrs:   Weight   07/09/20 1134 106.6 kg (235 lb)         Physical Exam  Vitals signs and nursing note reviewed. Constitutional:       General: He is in acute distress. Appearance: He is ill-appearing. Neck:      Musculoskeletal: Normal range of motion and neck supple. Vascular: No JVD. Cardiovascular:      Rate and Rhythm: Normal rate and regular rhythm. Heart sounds: Normal heart sounds. Pulmonary:      Effort: Pulmonary effort is normal.      Breath sounds: Normal breath sounds. No rhonchi. Abdominal:      General: Bowel sounds are normal. There is no distension. Palpations: Abdomen is soft. Tenderness: There is no abdominal tenderness. There is no rebound. Musculoskeletal: Normal range of motion. Skin:     General: Skin is warm and dry. Neurological:      Mental Status: He is alert and oriented to person, place, and time. Psychiatric:         Mood and Affect: Mood and affect normal.             Intake and Output:  Current Shift:  No intake/output data recorded.   Last three shifts:  07/10 1901 - 07/12 0700  In: -   Out: 2125 [Urine:2125]    Lab/Data Reviewed:  Recent Results (from the past 8 hour(s))   PTT    Collection Time: 07/12/20  3:30 AM   Result Value Ref Range    aPTT 69.6 (H) 23.0 - 36.4 SEC   CBC WITH AUTOMATED DIFF    Collection Time: 07/12/20  3:30 AM   Result Value Ref Range    WBC 8.4 4.6 - 13.2 K/uL    RBC 5.60 (H) 4.70 - 5.50 M/uL    HGB 17.5 (H) 13.0 - 16.0 g/dL    HCT 52.7 (H) 36.0 - 48.0 %    MCV 94.1 74.0 - 97.0 FL    MCH 31.3 24.0 - 34.0 PG    MCHC 33.2 31.0 - 37.0 g/dL    RDW 13.8 11.6 - 14.5 %    PLATELET 990 655 - 792 K/uL    MPV 11.3 9.2 - 11.8 FL    NEUTROPHILS 65 40 - 73 %    LYMPHOCYTES 27 21 - 52 %    MONOCYTES 6 3 - 10 %    EOSINOPHILS 2 0 - 5 %    BASOPHILS 0 0 - 2 %    ABS. NEUTROPHILS 5.5 1.8 - 8.0 K/UL    ABS. LYMPHOCYTES 2.2 0.9 - 3.6 K/UL    ABS. MONOCYTES 0.5 0.05 - 1.2 K/UL    ABS. EOSINOPHILS 0.1 0.0 - 0.4 K/UL    ABS. BASOPHILS 0.0 0.0 - 0.1 K/UL    DF AUTOMATED     METABOLIC PANEL, COMPREHENSIVE    Collection Time: 07/12/20  3:30 AM   Result Value Ref Range    Sodium 139 136 - 145 mmol/L    Potassium 4.6 3.5 - 5.5 mmol/L    Chloride 106 100 - 111 mmol/L    CO2 27 21 - 32 mmol/L    Anion gap 6 3.0 - 18 mmol/L    Glucose 90 74 - 99 mg/dL    BUN 15 7.0 - 18 MG/DL    Creatinine 0.98 0.6 - 1.3 MG/DL    BUN/Creatinine ratio 15 12 - 20      GFR est AA >60 >60 ml/min/1.73m2    GFR est non-AA >60 >60 ml/min/1.73m2    Calcium 8.5 8.5 - 10.1 MG/DL    Bilirubin, total 1.2 (H) 0.2 - 1.0 MG/DL    ALT (SGPT) 92 (H) 16 - 61 U/L    AST (SGOT) 40 (H) 10 - 38 U/L    Alk.  phosphatase 104 45 - 117 U/L    Protein, total 6.8 6.4 - 8.2 g/dL    Albumin 3.2 (L) 3.4 - 5.0 g/dL    Globulin 3.6 2.0 - 4.0 g/dL    A-G Ratio 0.9 0.8 - 1.7     MAGNESIUM    Collection Time: 07/12/20  3:30 AM   Result Value Ref Range    Magnesium 2.4 1.6 - 2.6 mg/dL   PHOSPHORUS    Collection Time: 07/12/20  3:30 AM   Result Value Ref Range    Phosphorus 3.4 2.5 - 4.9 MG/DL     Medications:  Current Facility-Administered Medications   Medication Dose Route Frequency    perflutren lipid microspheres (DEFINITY) in NS bolus IV  1 mL IntraVENous RAD ONCE    heparin 25,000 units in D5W 250 ml infusion  18-36 Units/kg/hr IntraVENous TITRATE    ascorbic acid (vitamin C) (VITAMIN C) tablet 500 mg  500 mg Oral DAILY    cholecalciferol (VITAMIN D3) (400 Units /10 mcg) tablet 5 Tab  2,000 Units Oral DAILY    zinc sulfate (ZINCATE) 220 (50) mg capsule 1 Cap  1 Cap Oral DAILY    melatonin tablet 5 mg  5 mg Oral QHS    pantoprazole (PROTONIX) tablet 40 mg  40 mg Oral DAILY       Recent Results (from the past 24 hour(s))   PTT    Collection Time: 07/12/20  3:30 AM   Result Value Ref Range    aPTT 69.6 (H) 23.0 - 36.4 SEC   CBC WITH AUTOMATED DIFF    Collection Time: 07/12/20  3:30 AM   Result Value Ref Range    WBC 8.4 4.6 - 13.2 K/uL    RBC 5.60 (H) 4.70 - 5.50 M/uL    HGB 17.5 (H) 13.0 - 16.0 g/dL    HCT 52.7 (H) 36.0 - 48.0 %    MCV 94.1 74.0 - 97.0 FL    MCH 31.3 24.0 - 34.0 PG    MCHC 33.2 31.0 - 37.0 g/dL    RDW 13.8 11.6 - 14.5 %    PLATELET 558 015 - 523 K/uL    MPV 11.3 9.2 - 11.8 FL    NEUTROPHILS 65 40 - 73 %    LYMPHOCYTES 27 21 - 52 %    MONOCYTES 6 3 - 10 %    EOSINOPHILS 2 0 - 5 %    BASOPHILS 0 0 - 2 %    ABS. NEUTROPHILS 5.5 1.8 - 8.0 K/UL    ABS. LYMPHOCYTES 2.2 0.9 - 3.6 K/UL    ABS. MONOCYTES 0.5 0.05 - 1.2 K/UL    ABS. EOSINOPHILS 0.1 0.0 - 0.4 K/UL    ABS. BASOPHILS 0.0 0.0 - 0.1 K/UL    DF AUTOMATED     METABOLIC PANEL, COMPREHENSIVE    Collection Time: 07/12/20  3:30 AM   Result Value Ref Range    Sodium 139 136 - 145 mmol/L    Potassium 4.6 3.5 - 5.5 mmol/L    Chloride 106 100 - 111 mmol/L    CO2 27 21 - 32 mmol/L    Anion gap 6 3.0 - 18 mmol/L    Glucose 90 74 - 99 mg/dL    BUN 15 7.0 - 18 MG/DL    Creatinine 0.98 0.6 - 1.3 MG/DL    BUN/Creatinine ratio 15 12 - 20      GFR est AA >60 >60 ml/min/1.73m2    GFR est non-AA >60 >60 ml/min/1.73m2    Calcium 8.5 8.5 - 10.1 MG/DL    Bilirubin, total 1.2 (H) 0.2 - 1.0 MG/DL    ALT (SGPT) 92 (H) 16 - 61 U/L    AST (SGOT) 40 (H) 10 - 38 U/L    Alk.  phosphatase 104 45 - 117 U/L    Protein, total 6.8 6.4 - 8.2 g/dL    Albumin 3.2 (L) 3.4 - 5.0 g/dL    Globulin 3.6 2.0 - 4.0 g/dL    A-G Ratio 0.9 0.8 - 1.7     MAGNESIUM Collection Time: 07/12/20  3:30 AM   Result Value Ref Range    Magnesium 2.4 1.6 - 2.6 mg/dL   PHOSPHORUS    Collection Time: 07/12/20  3:30 AM   Result Value Ref Range    Phosphorus 3.4 2.5 - 4.9 MG/DL       Procedures/imaging: see electronic medical records for all procedures/Xrays and details which were not copied into this note but were reviewed prior to creation of Ryan Painting MD   7/12/2020, 11:40 AM

## 2020-07-12 NOTE — PROGRESS NOTES
Problem: Falls - Risk of  Goal: *Absence of Falls  Description: Document Derik Palacios Fall Risk and appropriate interventions in the flowsheet.   Outcome: Progressing Towards Goal  Note: Fall Risk Interventions:            Medication Interventions: Patient to call before getting OOB, Teach patient to arise slowly                   Problem: Patient Education: Go to Patient Education Activity  Goal: Patient/Family Education  Outcome: Progressing Towards Goal     Problem: Pulmonary Embolism Care Plan (Adult)  Goal: *Improvement of existing pulmonary embolism  Outcome: Progressing Towards Goal  Goal: *Absence of bleeding  Outcome: Progressing Towards Goal  Goal: *Labs within defined limits  Outcome: Progressing Towards Goal     Problem: Patient Education: Go to Patient Education Activity  Goal: Patient/Family Education  Outcome: Progressing Towards Goal

## 2020-07-12 NOTE — PROGRESS NOTES
0700 Bedside and Verbal shift change report given to GIGI Lopez RN (oncoming nurse) by Grecia Godinez RN (offgoing nurse). Report included the following information SBAR, Kardex, Intake/Output, and MAR. Pt in bed, call light in reach. 0471 Pt assessed. No signs of acute distress. Pt in bed.    1321 Pt assessed. No signs of acute distress. Pt in bed. New bag of heparin hung, PTT came back therapeutic, new PTT placed for tomorrow am.    1510 Pt assessed. No signs of acute distress. Pt in bed.    2222 Pt assessed. No signs of acute distress. Pt in bed. Pt on heparin, shift uneventful. 2310 Bedside and Verbal shift change report given to CAROLE Schwab RN (oncoming nurse) by Andres Ruff. Ximena Lopez RN (offgoing nurse). Report included the following information SBAR, Kardex, Intake/Output, and MAR. Pt in bed, call light in reach.

## 2020-07-12 NOTE — PROGRESS NOTES
1915  Assumed care of pt   2055  Shift assessment complete   2349  Reassessment complete   0149  Discontinued duplicate orders   7919  Reassessment complete     Bedside and Verbal shift change report given to Katya Leblanc RN (oncoming nurse) by Sia Reynoso RN (offgoing nurse). Report included the following information SBAR, Kardex, ED Summary, Intake/Output, MAR, Recent Results, Med Rec Status and Cardiac Rhythm NSR.

## 2020-07-13 LAB — APTT PPP: 89.8 SEC (ref 23–36.4)

## 2020-07-13 PROCEDURE — 74011250636 HC RX REV CODE- 250/636: Performed by: EMERGENCY MEDICINE

## 2020-07-13 PROCEDURE — 85730 THROMBOPLASTIN TIME PARTIAL: CPT

## 2020-07-13 PROCEDURE — 74011250637 HC RX REV CODE- 250/637: Performed by: HOSPITALIST

## 2020-07-13 PROCEDURE — 65660000000 HC RM CCU STEPDOWN

## 2020-07-13 RX ADMIN — Medication 500 MG: at 08:42

## 2020-07-13 RX ADMIN — Medication 5 MG: at 21:50

## 2020-07-13 RX ADMIN — PANTOPRAZOLE SODIUM 40 MG: 40 TABLET, DELAYED RELEASE ORAL at 08:42

## 2020-07-13 RX ADMIN — CHOLECALCIFEROL TAB 10 MCG (400 UNIT) 5 TABLET: 10 TAB at 08:43

## 2020-07-13 RX ADMIN — ZINC SULFATE 220 MG (50 MG) CAPSULE 1 CAPSULE: CAPSULE at 08:43

## 2020-07-13 RX ADMIN — HEPARIN SODIUM 17 UNITS/KG/HR: 10000 INJECTION, SOLUTION INTRAVENOUS at 18:25

## 2020-07-13 RX ADMIN — HEPARIN SODIUM 17 UNITS/KG/HR: 10000 INJECTION, SOLUTION INTRAVENOUS at 03:24

## 2020-07-13 NOTE — PROGRESS NOTES
Covid pending  Please call me if needs ICU or worse ing respiratory status. I will see GYPSY Zamora MD

## 2020-07-13 NOTE — PROGRESS NOTES
2356  Pt resting quietly in bed with eyes closed. Awakens easily with no c/o voiced. VSS. CM denotes SR on box 23. Heparin gtt in progress, verified with off-going RN. Denies needs. NAD.    0208  Resting quietly in bed with eyes closed. NAD    0401  Cont to rest with no c/o. No change in assessment. VSS. Heparin remains in progress. Triflo 1500 ml, more dry cough noted with pt speaking. Pt reports he has postnasal drip. HARPREET hose right leg intact. 5085  Blood drawn from left arm without diff.    0800   [facility] BEDSIDE VERBAL Bedside shift change report given to [facility], RN(oncoming nurse) by CAROLE Encarnacion RN (offgoing nurse). Report included the following information SBAR, Kardex, Med Rec Status, Cardiac Rhythm SR and Alarm Parameters .

## 2020-07-13 NOTE — PROGRESS NOTES
Problem: Falls - Risk of  Goal: *Absence of Falls  Description: Document Shruthi Wells Fall Risk and appropriate interventions in the flowsheet.   Outcome: Progressing Towards Goal  Note: Fall Risk Interventions:            Medication Interventions: Teach patient to arise slowly                   Problem: Patient Education: Go to Patient Education Activity  Goal: Patient/Family Education  Outcome: Progressing Towards Goal     Problem: Pulmonary Embolism Care Plan (Adult)  Goal: *Improvement of existing pulmonary embolism  Outcome: Progressing Towards Goal  Goal: *Absence of bleeding  Outcome: Progressing Towards Goal  Goal: *Labs within defined limits  Outcome: Progressing Towards Goal

## 2020-07-13 NOTE — PROGRESS NOTES
Hospitalist Progress Note    Patient: Lottie Mares MRN: [id number]  CSN: [id number]    YOB: 1955  Age: 72 y.o. Sex: male    DOA: 7/9/2020 LOS:  LOS: 4 days                Assessment/Plan     Patient Active Problem List   Diagnosis Code    Acute pulmonary embolism with acute cor pulmonale (HCC) I26.09    Sinus tachycardia R00.0    SOB (shortness of breath) R06.02            72 y.o. male who has past medical history of prostate cancer, H pylori, DVT, peptic ulcer disease presents to ER with concerns of shortness of breath. Breathing better    Acute PE with acute cor pulmonale -  CTA chest with bilateral PE and evidence of heart strain. Started on heparin drip. Discussed with patient about choice of anticoagulation since [place] boat he would prefer NOAC. We will switch to this before discharge. Encourage incentive spirometry     Suspected COVID-19-  COVID 19 negative, repeat pending  Given PE and CT findings of groundglass opacities will check for COVID-19. We will start on vitamin C, vitamin D3, zinc, melatonin.     No leukocytosis or fever. Will hold onto starting any antibiotics    Disposition : 1-2 days    Review of systems  General: No fevers or chills. Cardiovascular: No chest pain or pressure. No palpitations. Pulmonary: No shortness of breath. Gastrointestinal: No nausea, vomiting. Physical Exam:  General: Awake, cooperative, no acute distress    HEENT: NC, Atraumatic. PERRLA, anicteric sclerae. Lungs: CTA Bilaterally. No Wheezing/Rhonchi/Rales. Heart:  S1 S2,  No murmur, No Rubs, No Gallops  Abdomen: Soft, Non distended, Non tender.  +Bowel sounds,   Extremities: No c/c/e  Psych:   Not anxious or agitated. Neurologic:  No acute neurological deficit.            Vital signs/Intake and Output:  Visit Vitals  BP (!) 134/94 (BP 1 Location: Left arm, BP Patient Position: Head of bed elevated (Comment degrees))   Pulse 74   Temp 97.6 °F (36.4 °C)   Resp 16   Ht 6' (1.829 m)   Wt 107.4 kg (236 lb 11.2 oz)   SpO2 98%   BMI 32.10 kg/m²     Current Shift:  No intake/output data recorded. Last three shifts:  07/11 1901 - 07/13 0700  In: 1611.5 [P.O.:1540; I.V.:71.5]  Out: 1175 [Urine:1175]            Labs: Results:       Chemistry Recent Labs     07/12/20  0330 07/11/20  0300   GLU 90 104*    138   K 4.6 4.3    107   CO2 27 24   BUN 15 15   CREA 0.98 0.96   CA 8.5 8.1*   AGAP 6 7   BUCR 15 16     --    TP 6.8  --    ALB 3.2*  --    GLOB 3.6  --    AGRAT 0.9  --       CBC w/Diff Recent Labs     07/12/20  0330 07/11/20  0300   WBC 8.4 9.0   RBC 5.60* 5.41   HGB 17.5* 16.7*   HCT 52.7* 50.4*    160   GRANS 65  --    LYMPH 27  --    EOS 2  --       Cardiac Enzymes No results for input(s): CPK, CKND1, ANJEL in the last 72 hours. No lab exists for component: CKRMB, TROIP   Coagulation Recent Labs     07/13/20  0650 07/12/20  1320   APTT 89.8* 81.8*       Lipid Panel No results found for: CHOL, CHOLPOCT, CHOLX, CHLST, CHOLV, 900465, HDL, HDLP, LDL, LDLC, DLDLP, 788561, VLDLC, VLDL, TGLX, TRIGL, TRIGP, TGLPOCT, CHHD, CHHDX   BNP No results for input(s): BNPP in the last 72 hours.    Liver Enzymes Recent Labs     07/12/20  0330   TP 6.8   ALB 3.2*         Thyroid Studies No results found for: T4, T3U, TSH, TSHEXT, TSHEXT     Procedures/imaging: see electronic medical records for all procedures/Xrays and details which were not copied into this note but were reviewed prior to creation of Plan

## 2020-07-13 NOTE — PROGRESS NOTES
Problem: Falls - Risk of  Goal: *Absence of Falls  Description: Document Felisha Govea Fall Risk and appropriate interventions in the flowsheet. Outcome: Progressing Towards Goal  Note: Fall Risk Interventions:            Medication Interventions: Patient to call before getting OOB                   Problem: Patient Education: Go to Patient Education Activity  Goal: Patient/Family Education  Outcome: Progressing Towards Goal     Problem: Pulmonary Embolism Care Plan (Adult)  Goal: *Improvement of existing pulmonary embolism  Outcome: Progressing Towards Goal  Goal: *Absence of bleeding  Outcome: Progressing Towards Goal  Goal: *Labs within defined limits  Outcome: Progressing Towards Goal     Problem: Patient Education: Go to Patient Education Activity  Goal: Patient/Family Education  Outcome: Progressing Towards Goal   The care plan was initiated and reviewed with pt. Care plan options were discussed and questions answered. The pt understand instructions and will follow up as directed.

## 2020-07-13 NOTE — PROGRESS NOTES
Verbal shift change report given to Felipa Keating Rn (oncoming nurse) by Brigitte Hood (offgoing nurse). Report included the following information SBAR, Kardex, Intake/Output, MAR and Recent Results.

## 2020-07-13 NOTE — PROGRESS NOTES
Problem: Falls - Risk of  Goal: *Absence of Falls  Description: Document Nata Escobar Fall Risk and appropriate interventions in the flowsheet. Outcome: Progressing Towards Goal  Note: No falls during shift. Fall Risk Interventions:            Medication Interventions: Patient to call before getting OOB, Teach patient to arise slowly                   Problem: Pulmonary Embolism Care Plan (Adult)  Goal: *Improvement of existing pulmonary embolism  Outcome: Progressing Towards Goal  Goal: *Absence of bleeding  Outcome: Progressing Towards Goal  Goal: *Labs within defined limits  Outcome: Progressing Towards Goal  Note: PTT became therapeutic.

## 2020-07-14 LAB
ANION GAP SERPL CALC-SCNC: 6 MMOL/L (ref 3–18)
APTT PPP: 104.8 SEC (ref 23–36.4)
BNP SERPL-MCNC: 1720 PG/ML (ref 0–900)
BUN SERPL-MCNC: 17 MG/DL (ref 7–18)
BUN/CREAT SERPL: 18 (ref 12–20)
CALCIUM SERPL-MCNC: 8.6 MG/DL (ref 8.5–10.1)
CHLORIDE SERPL-SCNC: 107 MMOL/L (ref 100–111)
CK MB CFR SERPL CALC: 7.5 % (ref 0–4)
CK MB SERPL-MCNC: 3.8 NG/ML (ref 5–25)
CK SERPL-CCNC: 51 U/L (ref 39–308)
CO2 SERPL-SCNC: 26 MMOL/L (ref 21–32)
CREAT SERPL-MCNC: 0.96 MG/DL (ref 0.6–1.3)
D DIMER PPP FEU-MCNC: 1.4 UG/ML(FEU)
ERYTHROCYTE [DISTWIDTH] IN BLOOD BY AUTOMATED COUNT: 13.6 % (ref 11.6–14.5)
GLUCOSE SERPL-MCNC: 104 MG/DL (ref 74–99)
HCT VFR BLD AUTO: 52 % (ref 36–48)
HGB BLD-MCNC: 17.3 G/DL (ref 13–16)
MCH RBC QN AUTO: 31.1 PG (ref 24–34)
MCHC RBC AUTO-ENTMCNC: 33.3 G/DL (ref 31–37)
MCV RBC AUTO: 93.4 FL (ref 74–97)
PLATELET # BLD AUTO: 229 K/UL (ref 135–420)
PMV BLD AUTO: 10.4 FL (ref 9.2–11.8)
POTASSIUM SERPL-SCNC: 4.6 MMOL/L (ref 3.5–5.5)
RBC # BLD AUTO: 5.57 M/UL (ref 4.7–5.5)
SODIUM SERPL-SCNC: 139 MMOL/L (ref 136–145)
TROPONIN I SERPL-MCNC: <0.02 NG/ML (ref 0–0.04)
WBC # BLD AUTO: 7.6 K/UL (ref 4.6–13.2)

## 2020-07-14 PROCEDURE — 74011250636 HC RX REV CODE- 250/636: Performed by: EMERGENCY MEDICINE

## 2020-07-14 PROCEDURE — 85730 THROMBOPLASTIN TIME PARTIAL: CPT

## 2020-07-14 PROCEDURE — 65660000000 HC RM CCU STEPDOWN

## 2020-07-14 PROCEDURE — 82550 ASSAY OF CK (CPK): CPT

## 2020-07-14 PROCEDURE — 85027 COMPLETE CBC AUTOMATED: CPT

## 2020-07-14 PROCEDURE — 83880 ASSAY OF NATRIURETIC PEPTIDE: CPT

## 2020-07-14 PROCEDURE — 74011250637 HC RX REV CODE- 250/637: Performed by: INTERNAL MEDICINE

## 2020-07-14 PROCEDURE — 80048 BASIC METABOLIC PNL TOTAL CA: CPT

## 2020-07-14 PROCEDURE — 85379 FIBRIN DEGRADATION QUANT: CPT

## 2020-07-14 PROCEDURE — 74011250637 HC RX REV CODE- 250/637: Performed by: HOSPITALIST

## 2020-07-14 RX ORDER — CALCIUM CARBONATE 200(500)MG
200 TABLET,CHEWABLE ORAL
Status: DISCONTINUED | OUTPATIENT
Start: 2020-07-15 | End: 2020-07-15

## 2020-07-14 RX ORDER — CARVEDILOL 3.12 MG/1
3.12 TABLET ORAL 2 TIMES DAILY WITH MEALS
Status: DISCONTINUED | OUTPATIENT
Start: 2020-07-14 | End: 2020-07-15 | Stop reason: HOSPADM

## 2020-07-14 RX ADMIN — CARVEDILOL 3.12 MG: 3.12 TABLET, FILM COATED ORAL at 18:10

## 2020-07-14 RX ADMIN — HEPARIN SODIUM 17 UNITS/KG/HR: 10000 INJECTION, SOLUTION INTRAVENOUS at 10:22

## 2020-07-14 RX ADMIN — Medication 500 MG: at 09:12

## 2020-07-14 RX ADMIN — PANTOPRAZOLE SODIUM 40 MG: 40 TABLET, DELAYED RELEASE ORAL at 09:12

## 2020-07-14 RX ADMIN — ZINC SULFATE 220 MG (50 MG) CAPSULE 1 CAPSULE: CAPSULE at 09:12

## 2020-07-14 RX ADMIN — CARVEDILOL 3.12 MG: 3.12 TABLET, FILM COATED ORAL at 12:26

## 2020-07-14 RX ADMIN — CHOLECALCIFEROL TAB 10 MCG (400 UNIT) 5 TABLET: 10 TAB at 09:12

## 2020-07-14 RX ADMIN — RIVAROXABAN 15 MG: 15 TABLET, FILM COATED ORAL at 18:11

## 2020-07-14 RX ADMIN — Medication 5 MG: at 20:06

## 2020-07-14 NOTE — PROGRESS NOTES
1915  Assumed care of pt   2148  Shift assessment complete   0126  Reassessment complete   0322  Reassessment complete       Bedside and Verbal shift change report given to Riley De Anda RN (oncoming nurse) by Jeremy Goldberg RN (offgoing nurse). Report included the following information SBAR, Kardex, ED Summary, Intake/Output, MAR, Recent Results, Med Rec Status and Cardiac Rhythm NSR.

## 2020-07-14 NOTE — PROGRESS NOTES
1915 Assumed patient care at this time. Report received from Renee Joshua RN.   0705 Heparin drip stopped at this time. 2003 Shift assessment completed at this time and documented in flow sheets. 4179 Paged Dr. Andrew Goodwin for patient's request for TUMS. [id number] Dr. Andrew Goodwin returned page at this time. Will put in orders. 7565 Paged Dr. Andrew Goodwin at this time regarding patient's second negative [place] Dr. Andrew Goodwin returned page at this time. Orders to remove isolation precautions. Bedside and verbal shift change report given to Nkechi Salinas RN (oncoming nurse) by Camilla Reyes RN (offgoing nurse). Report included the following information: SBAR, Kardex, MAR, and recent results.

## 2020-07-14 NOTE — PROGRESS NOTES
[facility]                  Pulmonary, Critical Care, and Sleep Medicine     Name: Lyubov Bruno MRN: [id number]   : 1955 Hospital: [facility] [place]    Date: 2020        [facility] Note                                              Consult requesting physician: Dr. Juanjo Falcon, Dr. Fredrick Vieira  Reason for Consult: PE      IMPRESSION:   Acute pulmonary embolism with acute cor pulmonale (bilateral extensive lobar and subsegmental PEs with RV strain. Hx PE/DVT after knee surgery in about ; lives on a boat since > 2 years; RV strain; d-dimer 5.38; BNP 6979; troponin 0 0.04). I26.09 ·     · 2 mm RUL pulmonary nodule. ·   Patient Active Problem List   Diagnosis Code    Acute pulmonary embolism with acute cor pulmonale (HCC) I26.09    Sinus tachycardia R00.0    SOB (shortness of breath) R06.02           RECOMMENDATIONS:   On Heparin drip >48 hrs  Now on Ra please change to Eliquis or xarelto  Follow up BNP/trop/ddimer  Hypertension I started low dose coreg  Patient lives on a boat and so frequent INR check would be problematic and so patient agrees to go on newer oral anticoagulants. Can transition from heparin to newer oral anticoagulants. On room air resting. Echo and PVL LE pending. COVID19 negative (ordered due to mild nonspecific GGO in RUL); second COVID19 pending. Low probability for COVID19 pneumonia. Recommend outpatient hematology work-up. Consider outpatient pulmonary nodule evaluation. FiO2 to keep SpO2 >=92%, HOB >=30 degree, aspiration precautions, aggressive pulmonary toileting, incentive spirometry, PT/OT eval and treat, mobilization. Other issues management by primary team and respective consultants. Further recommendations will be based on the patient's response to recommended treatment and results of the investigation ordered. Quality Care: PPI, after knee surgery in the past, prophylaxis, HOB elevated, infection control all reviewed and addressed. Discussed with patient radiologic work up showed, answered all questions to their satisfaction. Care plan discussed with nursing, RN. Subjective/History:     Randee Montes is a 72 y.o. male with PMHx significant for DVT/PE after knee surgery in about 2012, peptic ulcer disease, who came to ER with shortness of breath since 2.5 months off and on, diagnosed with mild nonspecific peripheral GGO RUL with extensive bilateral lobar and segmental pulmonary emboli with right heart strain. Patient lives on a boat since more than 2 years. Denies recent surgery, hospitalization, acute illness, long car or airline travel. 7/14/2020     Still sob but oxygen excellent on RA  Ambulate covid pending  Add low dose coreg BP elevated >24 hrs  pateitn will need PCP  6 minutes walk test before going home  Today follow up ddimer/bnp/trop          7/12/2020  On RA resting  COVID 19 negative; second COVID19 7/11/2020: Pending. Tolerating heparin drip without any bleeding. Platelets stable normal.  No cough fever leg edema cp  Dyspnea improving. No other pulmonary issues reported. Review of Systems:   HEENT: No epistaxis, no nasal drainage, no difficulty in swallowing, no redness in eyes  Respiratory: as above  Cardiovascular: no chest pain, no palpitations, no chronic leg edema, no syncope  Gastrointestinal: no abd pain, no vomiting, no diarrhea, no bleeding symptoms  Genitourinary: No urinary symptoms or hematuria  Integument/breast: No ulcers or rashes  Musculoskeletal:Neg  Neurological: No focal weakness, no seizures, no headaches  Behvioral/Psych: No anxiety, no depression  Constitutional: No fever, no chills, no weight loss, no night sweats       No Known Allergies     Past Medical History:   Diagnosis Date    DVT of leg (deep venous thrombosis) (HCC)     H pylori ulcer     PE (pulmonary thromboembolism) (HCC)     Prostate CA ([facility] [address]         History reviewed. No pertinent surgical history.      History reviewed. No pertinent family history. Social History     Tobacco Use    Smoking status: Never Smoker    Smokeless tobacco: Never Used   Substance Use Topics    Alcohol use: Yes        Prior to Admission medications    Not on File       Current Facility-Administered Medications   Medication Dose Route Frequency    carvediloL (COREG) tablet 3.125 mg  3.125 mg Oral BID WITH MEALS    heparin 25,000 units in D5W 250 ml infusion  18-36 Units/kg/hr IntraVENous TITRATE    ascorbic acid (vitamin C) (VITAMIN C) tablet 500 mg  500 mg Oral DAILY    cholecalciferol (VITAMIN D3) (400 Units /10 mcg) tablet 5 Tab  2,000 Units Oral DAILY    zinc sulfate (ZINCATE) 220 (50) mg capsule 1 Cap  1 Cap Oral DAILY    melatonin tablet 5 mg  5 mg Oral QHS    pantoprazole (PROTONIX) tablet 40 mg  40 mg Oral DAILY         Objective:   Vital Signs:    Visit Vitals  BP (!) 142/95 (BP 1 Location: Left arm, BP Patient Position: At rest)   Pulse 73   Temp 98.6 °F (37 °C)   Resp 16   Ht 6' (1.829 m)   Wt 107.4 kg (236 lb 11.2 oz)   SpO2 98%   BMI 32.10 kg/m²       O2 Device: Room air       Temp (24hrs), Av.2 °F (36.8 °C), Min:97.6 °F (36.4 °C), Max:98.6 °F (37 °C)       Intake/Output:   Last shift:      701 - 1900  In: -   Out: 200 [Urine:200]  Last 3 shifts: 1901 -  07  In: 171.5 [P.O.:100; I.V.:71.5]  Out: 700 [Urine:700]    Intake/Output Summary (Last 24 hours) at 2020 1153  Last data filed at 2020 0915  Gross per 24 hour   Intake    Output 700 ml   Net -700 ml       Physical Exam:     General/Neurology: Alert, Awake, NAD. Head:   Normocephalic, without obvious abnormality, atraumatic. Neck:   Supple, symmetric. No carotid bruit. No lymphadenopathy. Trachea midline  Lung:   Good air entry bilateral equal. No rales. No rhonchi. No wheezing. No stridors. No prolongded expiration. No accessory muscle use. Heart:   Regular rate & rhythm. S1 S2 present. No murmur. No JVD. Abdomen:  Soft. NT. ND. +BS. No masses. Extremities:  No pedal edema. No cyanosis. No clubbing. Pulses: 2+ and symmetric in DP. Capillary refill: normal.   Lymphatic:  No cervical or supraclavicular palpable lymphadenopathy. Data:       Recent Results (from the past 24 hour(s))   CBC W/O DIFF    Collection Time: 07/14/20  3:30 AM   Result Value Ref Range    WBC 7.6 4.6 - 13.2 K/uL    RBC 5.57 (H) 4.70 - 5.50 M/uL    HGB 17.3 (H) 13.0 - 16.0 g/dL    HCT 52.0 (H) 36.0 - 48.0 %    MCV 93.4 74.0 - 97.0 FL    MCH 31.1 24.0 - 34.0 PG    MCHC 33.3 31.0 - 37.0 g/dL    RDW 13.6 11.6 - 14.5 %    PLATELET 549 716 - 671 K/uL    MPV 10.4 9.2 - 60.7 FL   METABOLIC PANEL, BASIC    Collection Time: 07/14/20  3:30 AM   Result Value Ref Range    Sodium 139 136 - 145 mmol/L    Potassium 4.6 3.5 - 5.5 mmol/L    Chloride 107 100 - 111 mmol/L    CO2 26 21 - 32 mmol/L    Anion gap 6 3.0 - 18 mmol/L    Glucose 104 (H) 74 - 99 mg/dL    BUN 17 7.0 - 18 MG/DL    Creatinine 0.96 0.6 - 1.3 MG/DL    BUN/Creatinine ratio 18 12 - 20      GFR est AA >60 >60 ml/min/1.73m2    GFR est non-AA >60 >60 ml/min/1.73m2    Calcium 8.6 8.5 - 10.1 MG/DL   PTT    Collection Time: 07/14/20  3:30 AM   Result Value Ref Range    aPTT 104.8 (H) 23.0 - 36.4 SEC         Chemistry Recent Labs     07/14/20  0330 07/12/20  0330   * 90    139   K 4.6 4.6    106   CO2 26 27   BUN 17 15   CREA 0.96 0.98   CA 8.6 8.5   MG  --  2.4   PHOS  --  3.4   AGAP 6 6   BUCR 18 15   AP  --  104   TP  --  6.8   ALB  --  3.2*   GLOB  --  3.6   AGRAT  --  0.9        Lactic Acid No results found for: LAC  No results for input(s): LAC in the last 72 hours.      Liver Enzymes Protein, total   Date Value Ref Range Status   07/12/2020 6.8 6.4 - 8.2 g/dL Final     Albumin   Date Value Ref Range Status   07/12/2020 3.2 (L) 3.4 - 5.0 g/dL Final     Globulin   Date Value Ref Range Status   07/12/2020 3.6 2.0 - 4.0 g/dL Final     A-G Ratio   Date Value Ref Range Status   07/12/2020 0.9 0.8 - 1.7   Final     Alk. phosphatase   Date Value Ref Range Status   07/12/2020 104 45 - 117 U/L Final     Recent Labs     07/12/20  0330   TP 6.8   ALB 3.2*   GLOB 3.6   AGRAT 0.9           CBC w/Diff Recent Labs     07/14/20  0330 07/12/20  0330   WBC 7.6 8.4   RBC 5.57* 5.60*   HGB 17.3* 17.5*   HCT 52.0* 52.7*    195   GRANS  --  65   LYMPH  --  27   EOS  --  2        Cardiac Enzymes No results found for: CPK, CK, CKMMB, CKMB, RCK3, CKMBT, CKNDX, CKND1, ANJEL, TROPT, TROIQ, RIVER, TROPT, TNIPOC, BNP, BNPP     BNP No results found for: BNP, BNPP, XBNPT     Coagulation Recent Labs     07/14/20  0330 07/13/20  0650 07/12/20  1320   APTT 104.8* 89.8* 81.8*         Thyroid  No results found for: T4, T3U, TSH, TSHEXT, TSHEXT    No results found for: T4     Urinalysis No results found for: COLOR, APPRN, SPGRU, REFSG, PIYUSH, PROTU, GLUCU, KETU, BILU, UROU, ARACELY, LEUKU, GLUKE, EPSU, BACTU, WBCU, RBCU, CASTS, UCRY     Micro  No results for input(s): SDES, CULT in the last 72 hours. No results for input(s): CULT in the last 72 hours. ABG No results for input(s): PHI, PHI, POC2, PCO2I, PO2, PO2I, HCO3, HCO3I, FIO2, FIO2I in the last 72 hours. CT (Most Recent) (CT chest reviewed by me) Results from Hospital Encounter encounter on 07/09/20   CTA CHEST W OR W WO CONT    Narrative EXAM: CTA Chest    CLINICAL INDICATION/HISTORY: Shortness of breath, tachycardia. Possible PE.    COMPARISON: Plain film chest same day    TECHNIQUE: Axial CT imaging from the thoracic inlet through the diaphragm with  intravenous contrast utilizing CTA study for pulmonary artery evaluation. Coronal and sagittal MIP reformations were generated at a separate workstation. One or more dose reduction techniques were used on this CT: automated exposure  control, adjustment of the mAs and/or kVp according to patient's size, and  iterative reconstruction techniques.  The specific techniques utilized on this CT  exam have been documented in the patient's electronic medical record. Digital  imaging and communications and medicine (DICOM) format image data are available  to nonaffiliated external healthcare facilities or entities on a secure, media  free, reciprocally searchable basis with patient authorization for at least a 12  month period after this study. _______________    FINDINGS:    EXAM QUALITY: Overall exam quality is adequate. Pulmonary arterial enhancement  is adequate. The breath hold is satisfactory. PULMONARY ARTERIES: There is extensive pulmonary emboli, with complete occlusion  of the proximal left lower lobe artery with no contrast seen more distally  within the left lower lobe peripheral vessels. There is minimal eccentric clot  within proximal lingular segment. There is some partial nonocclusive clot within  anterior segment left upper lobe artery. There is complete occlusion of the  proximal right upper lobe artery with absent contrast seen more peripherally in  the right upper lobe vessels. Partial nonocclusive clot seen within the proximal  middle lobe arteries. There is occlusive clot involving proximal lateral  segmental right lower lobe artery. No filling defect within the main pulmonary  arteries. MEDIASTINUM: There is evidence of right heart strain with RV/LV ratio greater  than 1, right ventricle measuring 5.5 cm and left ventricle measuring 3.9 cm. There is mild associated septal bowing and some reflux of contrast into the IVC  and hepatic veins. There is also prominence of the azygos vein. Normal heart  size with no pericardial effusion. Coronary artery calcifications. Thoracic  aorta unremarkable. LYMPH NODES: No enlarged mediastinal or hilar nodes by size criteria. AIRWAY: Unremarkable. LUNGS: There is 2 mm diameter parenchymal nodule within the posterior right  upper lobe, axial image #30. Mild peripheral groundglass density lateral and  posterior lateral right upper lobe.  No other area of consolidation or mass. PLEURA: No pleural effusion or pneumothorax. UPPER ABDOMEN: Visualized upper abdomen is unremarkable. .    OTHER: Degenerative lower cervical and thoracic spondylosis with no acute or  aggressive osseous findings. Proximal right humeral prosthesis noted. _______________      Impression IMPRESSION:    1. Extensive bilateral lobar and segmental pulmonary emboli as detailed above,  with evidence of right heart strain. This was discussed with Dr. Giuseppe Corona at  1:21 PM.    2. Mild nonspecific peripheral groundglass density right upper lobe. 3. 2 mm right upper lobe nodule nonspecific but likely benign. 2.            XR (Most Recent). CXR  reviewed by me and compared with previous CXR Results from Hospital Encounter encounter on 07/09/20   XR CHEST PORT    Narrative EXAM: XR CHEST PORT    CLINICAL INDICATION/HISTORY: sob, tachycardia  -Additional: None    COMPARISON: None    TECHNIQUE: Frontal view of the chest    _______________    FINDINGS:    HEART AND MEDIASTINUM: Midline cardiac silhouette, normal in size. Unremarkable  hilar vascular structures. LUNGS AND PLEURAL SPACES: No focal consolidation, parenchymal opacity. No  pneumothorax or pleural effusion. BONY THORAX AND SOFT TISSUES: Total right shoulder arthroplasty. Moderate  appearing Left glenohumeral DJD. Otherwise, no acute osseous abnormality    _______________      Impression IMPRESSION:    1. No acute cardiopulmonary process.              Angel Kang MD  7/14/2020

## 2020-07-14 NOTE — PROGRESS NOTES
Problem: Falls - Risk of  Goal: *Absence of Falls  Description: Document [place] Fall Risk and appropriate interventions in the flowsheet.   Outcome: Progressing Towards Goal  Note: Fall Risk Interventions:            Medication Interventions: Patient to call before getting OOB                   Problem: Patient Education: Go to Patient Education Activity  Goal: Patient/Family Education  Outcome: Progressing Towards Goal     Problem: Pulmonary Embolism Care Plan (Adult)  Goal: *Improvement of existing pulmonary embolism  Outcome: Progressing Towards Goal  Goal: *Absence of bleeding  Outcome: Progressing Towards Goal  Goal: *Labs within defined limits  Outcome: Progressing Towards Goal     Problem: Patient Education: Go to Patient Education Activity  Goal: Patient/Family Education  Outcome: Progressing Towards Goal

## 2020-07-14 NOTE — PROGRESS NOTES
1808 [facility] care of the patient from [address] (offgoing Nurse). 4431 Shift assessment done    1225 Reassessment done with no changes noted. 1800 Reassessment done with no changes noted. 1900 Bedside and Verbal shift change report given to [address] [place] [address] Julio Cesar (oncoming nurse) by Koki Cabrera RN (offgoing nurse). Report included the following information SBAR, Kardex, MAR, Recent Results and Cardiac Rhythm .

## 2020-07-14 NOTE — PROGRESS NOTES
1808 [facility] care of the patient from [address] [facility] Faisal (offgoing Nurse). 7959 Shift assessment     1225 Reassessment done with no changes noted.

## 2020-07-14 NOTE — PROGRESS NOTES
Hospitalist Progress Note    Patient: Juan C Jacob MRN: [id number]  CSN: [id number]    YOB: 1955  Age: 72 y.o. Sex: male    DOA: 7/9/2020 LOS:  LOS: 5 days                Assessment/Plan     Patient Active Problem List   Diagnosis Code    Acute pulmonary embolism with acute cor pulmonale (HCC) I26.09    Sinus tachycardia R00.0    SOB (shortness of breath) R06.02            72 y.o. male who has past medical history of prostate cancer, H pylori, DVT, peptic ulcer disease presents to ER with concerns of shortness of breath. Breathing better    Acute PE with acute cor pulmonale -  CTA chest with bilateral PE and evidence of heart strain. Started on heparin drip. Discussed with patient about choice of anticoagulation since he is living on a boat he would prefer NOAC. We will switch to this before discharge. Encourage incentive spirometry     Suspected COVID-19-  COVID 19 negative, repeat pending  Given PE and CT findings of groundglass opacities will check for COVID-19. We will start on vitamin C, vitamin D3, zinc, melatonin.     No leukocytosis or fever. Will hold onto starting any antibiotics    Disposition : 1-2 days    Review of systems  General: No fevers or chills. Cardiovascular: No chest pain or pressure. No palpitations. Pulmonary: No shortness of breath. Gastrointestinal: No nausea, vomiting. Physical Exam:  General: Awake, cooperative, no acute distress    HEENT: NC, Atraumatic. PERRLA, anicteric sclerae. Lungs: CTA Bilaterally. No Wheezing/Rhonchi/Rales. Heart:  S1 S2,  No murmur, No Rubs, No Gallops  Abdomen: Soft, Non distended, Non tender.  +Bowel sounds,   Extremities: No c/c/e  Psych:   Not anxious or agitated. Neurologic:  No acute neurological deficit.            Vital signs/Intake and Output:  Visit Vitals  BP (!) 156/99 (BP 1 Location: Left arm)   Pulse 75   Temp 97.8 °F (36.6 °C)   Resp 16   Ht 6' (1.829 m)   Wt 107.4 kg (236 lb 11.2 oz)   SpO2 100%   BMI 32.10 kg/m²     Current Shift:  07/14 0701 - 07/14 1900  In: 480 [P.O.:480]  Out: 750 [Urine:750]  Last three shifts:  07/12 1901 - 07/14 0700  In: 171.5 [P.O.:100; I.V.:71.5]  Out: 700 [Urine:700]            Labs: Results:       Chemistry Recent Labs     07/14/20 0330 07/12/20  0330   * 90    139   K 4.6 4.6    106   CO2 26 27   BUN 17 15   CREA 0.96 0.98   CA 8.6 8.5   AGAP 6 6   BUCR 18 15   AP  --  104   TP  --  6.8   ALB  --  3.2*   GLOB  --  3.6   AGRAT  --  0.9      CBC w/Diff Recent Labs     07/14/20 0330 07/12/20  0330   WBC 7.6 8.4   RBC 5.57* 5.60*   HGB 17.3* 17.5*   HCT 52.0* 52.7*    195   GRANS  --  65   LYMPH  --  27   EOS  --  2      Cardiac Enzymes Recent Labs     07/14/20  0330   CPK 51   CKND1 7.5*      Coagulation Recent Labs     07/14/20  0330 07/13/20  0650   APTT 104.8* 89.8*       Lipid Panel No results found for: CHOL, CHOLPOCT, CHOLX, CHLST, CHOLV, 479294, HDL, HDLP, LDL, LDLC, DLDLP, 459422, VLDLC, VLDL, TGLX, TRIGL, TRIGP, TGLPOCT, CHHD, CHHDX   BNP No results for input(s): BNPP in the last 72 hours.    Liver Enzymes Recent Labs     07/12/20  0330   TP 6.8   ALB 3.2*         Thyroid Studies No results found for: T4, T3U, TSH, TSHEXT, TSHEXT     Procedures/imaging: see electronic medical records for all procedures/Xrays and details which were not copied into this note but were reviewed prior to creation of Plan

## 2020-07-14 NOTE — PROGRESS NOTES
Plan: home with MD follow up    Chart reviewed, noted pt nearing discharge, per MD note today vs tomorrow. Per previous CM note:   Pt lives on boat with wife currently  Will need local PCP if needed for follow up at discharge if he plans to remain locally for the near future. Pt uses no DME or home services prior to admission. Wife will pick pt up at discharge. CM will continue to follow, no new needs identified. Care Management Interventions  PCP Verified by CM:  Yes  Transition of Care Consult (CM Consult): Discharge Planning  Current Support Network: Lives with Spouse  Confirm Follow Up Transport: Family  Discharge Location  Discharge Placement: Home with family assistance

## 2020-07-14 NOTE — PROGRESS NOTES
conducted an initial consultation and Spiritual Assessment for Rolando Moore, who is a 72 y. o.,male. Patients Primary Language is: [place]. According to the patients EMR Yazidism Affiliation is: No preference. The reason the Patient came to the hospital is:   Patient Active Problem List    Diagnosis Date Noted    Acute pulmonary embolism with acute cor pulmonale ([facility] [address] 07/09/2020    Sinus tachycardia 07/09/2020    SOB (shortness of breath) 07/09/2020        The  provided the following Interventions:  Initiated a relationship of care and support through phone conversation with patient. Explored issues of atul, belief, spirituality and Spiritism/ritual needs while hospitalized. Listened empathically. Provided chaplaincy education. Provided information about Spiritual Care Services. Offered assurance of prayer on patient's behalf. Chart reviewed. Assessment:  Patient does not have any Spiritism/cultural needs that will affect patients preferences in health care. Plan:  Chaplains will continue to follow and will provide pastoral care on an as needed/requested basis.  recommends bedside caregivers page  on duty if patient shows signs of acute spiritual or emotional distress. Rev.  [address] [facility]  [phone]

## 2020-07-15 VITALS
TEMPERATURE: 97.8 F | HEART RATE: 86 BPM | DIASTOLIC BLOOD PRESSURE: 91 MMHG | WEIGHT: 236.7 LBS | HEIGHT: 72 IN | BODY MASS INDEX: 32.06 KG/M2 | RESPIRATION RATE: 18 BRPM | OXYGEN SATURATION: 96 % | SYSTOLIC BLOOD PRESSURE: 130 MMHG

## 2020-07-15 PROBLEM — I27.20 PULMONARY HYPERTENSION (HCC): Status: ACTIVE | Noted: 2020-07-15

## 2020-07-15 LAB
SARS-COV-2, COV2NT: NOT DETECTED
SOURCE, COVRS: NORMAL

## 2020-07-15 PROCEDURE — 74011250637 HC RX REV CODE- 250/637: Performed by: FAMILY MEDICINE

## 2020-07-15 PROCEDURE — 74011250637 HC RX REV CODE- 250/637: Performed by: HOSPITALIST

## 2020-07-15 PROCEDURE — 74011250637 HC RX REV CODE- 250/637: Performed by: INTERNAL MEDICINE

## 2020-07-15 RX ORDER — CALCIUM CARBONATE 200(500)MG
200 TABLET,CHEWABLE ORAL
Status: DISCONTINUED | OUTPATIENT
Start: 2020-07-15 | End: 2020-07-15 | Stop reason: HOSPADM

## 2020-07-15 RX ORDER — CARVEDILOL 3.12 MG/1
3.12 TABLET ORAL 2 TIMES DAILY WITH MEALS
Qty: 180 TAB | Refills: 0 | Status: SHIPPED | OUTPATIENT
Start: 2020-07-15

## 2020-07-15 RX ORDER — RIVAROXABAN 15 MG-20MG
KIT ORAL
Qty: 1 DOSE PACK | Refills: 0 | Status: SHIPPED | OUTPATIENT
Start: 2020-07-15

## 2020-07-15 RX ADMIN — ANTACID TABLETS 200 MG: 500 TABLET, CHEWABLE ORAL at 00:15

## 2020-07-15 RX ADMIN — CHOLECALCIFEROL TAB 10 MCG (400 UNIT) 5 TABLET: 10 TAB at 08:45

## 2020-07-15 RX ADMIN — PANTOPRAZOLE SODIUM 40 MG: 40 TABLET, DELAYED RELEASE ORAL at 08:46

## 2020-07-15 RX ADMIN — RIVAROXABAN 15 MG: 15 TABLET, FILM COATED ORAL at 08:00

## 2020-07-15 RX ADMIN — RIVAROXABAN 15 MG: 15 TABLET, FILM COATED ORAL at 17:21

## 2020-07-15 RX ADMIN — CARVEDILOL 3.12 MG: 3.12 TABLET, FILM COATED ORAL at 17:21

## 2020-07-15 RX ADMIN — ZINC SULFATE 220 MG (50 MG) CAPSULE 1 CAPSULE: CAPSULE at 08:45

## 2020-07-15 RX ADMIN — CARVEDILOL 3.12 MG: 3.12 TABLET, FILM COATED ORAL at 08:46

## 2020-07-15 RX ADMIN — Medication 500 MG: at 08:46

## 2020-07-15 RX ADMIN — ANTACID TABLETS 200 MG: 500 TABLET, CHEWABLE ORAL at 08:45

## 2020-07-15 NOTE — PROGRESS NOTES
0900:Received verbal bedside report from off going nurse MARIN Birmingham R.N. Patient care received. Patient alert and oriented x 4. Patient resting in bed denies pain. Patient stable. Call light with in reach bed in lowest position.

## 2020-07-15 NOTE — ROUTINE PROCESS
Step 1)     Oxygen saturation at ______98_______% on room air at rest.    If less than 88%: STOP! No further documentation needed; Otherwise proceeded to step 2 and 3      Step 2)     Oxygen saturation at  ______86_______% on room air with exertion     while walking for 6 minutes. Oxygen added to patient.       3)_____100_________% Rest with O2    Step 4)     Oxygen saturation at ______100_______% while walking on Oxygen at __2_______LPM      Via:   [x] Nasal Cannula         [] Simple Face Mask          [] Non-Re-Breather Mask        [] Partial Re-Breather Mask          [] Venturi Mask

## 2020-07-15 NOTE — PROGRESS NOTES
Problem: Falls - Risk of  Goal: *Absence of Falls  Description: Document Carmen [place] Fall Risk and appropriate interventions in the flowsheet.   Outcome: Progressing Towards Goal  Note: Fall Risk Interventions:            Medication Interventions: Teach patient to arise slowly                   Problem: Patient Education: Go to Patient Education Activity  Goal: Patient/Family Education  Outcome: Progressing Towards Goal     Problem: Pulmonary Embolism Care Plan (Adult)  Goal: *Improvement of existing pulmonary embolism  Outcome: Progressing Towards Goal  Goal: *Absence of bleeding  Outcome: Progressing Towards Goal  Goal: *Labs within defined limits  Outcome: Progressing Towards Goal     Problem: Patient Education: Go to Patient Education Activity  Goal: Patient/Family Education  Outcome: Progressing Towards Goal

## 2020-07-15 NOTE — DISCHARGE SUMMARY
Discharge Summary    Patient: Genaro Chowdhury MRN: [id number]  CSN: [id number]    YOB: 1955  Age: 72 y.o. Sex: male    DOA: 7/9/2020 LOS:  LOS: 6 days   Discharge Date: 7/15/2020     Primary Care Provider:  Other, MD Fernanda    Admission Diagnoses: Pulmonary embolism [facility]) [I26.99]    Discharge Diagnoses:    Problem List as of 7/15/2020 Never Reviewed          Codes Class Noted - Resolved    Pulmonary hypertension ([facility] ICD-10-CM: I27.20  ICD-9-CM: 416.8  7/15/2020 - Present        * (Principal) Acute pulmonary embolism with acute cor pulmonale ([facility] ICD-10-CM: I26.09  ICD-9-CM: 415.19, 415.0  7/9/2020 - Present        Sinus tachycardia ICD-10-CM: R00.0  ICD-9-CM: 427.89  7/9/2020 - Present        SOB (shortness of breath) ICD-10-CM: R06.02  ICD-9-CM: 786.05  7/9/2020 - Present              Discharge Medications:     Discharge Medication List as of 7/15/2020  4:55 PM      START taking these medications    Details   carvediloL (COREG) 3.125 mg tablet Take 1 Tab by mouth two (2) times daily (with meals). , Print, Disp-180 Tab,R-0      rivaroxaban (Xarelto) 15 mg (42)- 20 mg (9) DsPk Take one 15 mg tablet twice a day with food for the first 21 days. Then, take one 20 mg tablet once a day with food for 9 days. , Print, Disp-1 Dose Pack,R-0      rivaroxaban (Xarelto) 20 mg tab tablet Take 1 Tab by mouth daily (with breakfast) for 90 days. , Print, Disp-90 Tab,R-0             Discharge Condition: Good    Procedures : none    Consults: Pulmonary/Critical Care      PHYSICAL EXAM   Visit Vitals  BP (!) 130/91 (BP 1 Location: Left arm, BP Patient Position: At rest)   Pulse 86   Temp 97.8 °F (36.6 °C)   Resp 18   Ht 6' (1.829 m)   Wt 107.4 kg (236 lb 11.2 oz)   SpO2 96%   BMI 32.10 kg/m²     General: Awake, cooperative, no acute distress    HEENT: NC, Atraumatic. PERRLA, EOMI. Anicteric sclerae. Lungs:  CTA Bilaterally. No Wheezing/Rhonchi/Rales.   Heart:  Regular  rhythm,  No murmur, No Rubs, No Gallops  Abdomen: Soft, Non distended, Non tender. +Bowel sounds,   Extremities: No c/c/e  Psych:   Not anxious or agitated. Neurologic:  No acute neurological deficits. Admission HPI :   Annabel Gomez is a 72 y.o. male who has past medical history of prostate cancer, H pylori, DVT, peptic ulcer disease presents to ER with concerns of shortness of breath. He reports that he has been having shortness of breath for about 3-1/2 months which is on and off. He has been staying on a boat for over 2 years. He reports that about a month ago he had some shortness of breath that was more than usual however it subsided subsided. This time he his shortness of breath has been progressive and getting worse. He denies any chest pain or cough. Or any syncopal episode. He reports that he had DVT about 8 years ago after he had his knee surgery. He was on anticoagulation. He has history of prostate cancer. In ER his CTA chest showed bilateral PE with evidence of right heart strain. CT chest also showed some groundglass opacities in lower lungs. His NT proBNP at 6979. Hospital Course :   Mr. Ta was initially admitted to COVID-19 unit, given the thromboembolism and CT chest findings of groundglass opacities in lower lung COVID 19 was suspected. His COVID 19 test negative x2. He was then transferred to regular monitored floor. Acute PE with acute cor pulmonale-  CTA chest with bilateral PE and evidence of right heart strain. He was started on heparin drip. Discussed anticoagulation with him and he wanted to start on NOAC. His lower extremity Doppler showed age-indeterminate thrombus present in right distal femoral vein, age-indeterminate thrombus present in right popliteal vein, acute thrombus present in left popliteal vein. Echocardiogram done showed normal left ventricular systolic function, ejection fraction of 55 to 26%, grade 1 diastolic dysfunction.   Right ventricle dilated, reduced systolic function, findings are consistent with Greenfield sign. Pulmonary arterial systolic pressure is 54 mmHg, pulmonary hypertension found to be moderate. He was started on Xarelto. And his heparin drip stopped. He was also started on Coreg for blood pressure. He was ambulated and his oxygen saturations dropped to 86% on ambulation. Given his PE and pulmonary hypertension he will require home oxygen. This was arranged. He is advised to follow-up with PCP as outpatient. Activity: Activity as tolerated    Diet: Regular Diet    Follow-up: PCP    Disposition: home    Minutes spent on discharge: 45       Labs: Results:       Chemistry Recent Labs     07/14/20 0330   *      K 4.6      CO2 26   BUN 17   CREA 0.96   CA 8.6   AGAP 6   BUCR 18      CBC w/Diff Recent Labs     07/14/20 0330   WBC 7.6   RBC 5.57*   HGB 17.3*   HCT 52.0*         Cardiac Enzymes Recent Labs     07/14/20  0330   CPK 51   CKND1 7.5*      Coagulation Recent Labs     07/14/20  0330 07/13/20  0650   APTT 104.8* 89.8*       Lipid Panel No results found for: CHOL, CHOLPOCT, CHOLX, CHLST, CHOLV, 468310, HDL, HDLP, LDL, LDLC, DLDLP, 864159, VLDLC, VLDL, TGLX, TRIGL, TRIGP, TGLPOCT, CHHD, CHHDX   BNP No results for input(s): BNPP in the last 72 hours. Liver Enzymes No results for input(s): TP, ALB, TBIL, AP in the last 72 hours. No lab exists for component: SGOT, GPT, DBIL   Thyroid Studies No results found for: T4, T3U, TSH, TSHEXT         Significant Diagnostic Studies: Cta Chest W Or W Wo Cont    Result Date: 7/9/2020  EXAM: CTA Chest CLINICAL INDICATION/HISTORY: Shortness of breath, tachycardia. Possible PE. COMPARISON: Plain film chest same day TECHNIQUE: Axial CT imaging from the thoracic inlet through the diaphragm with intravenous contrast utilizing CTA study for pulmonary artery evaluation. Coronal and sagittal MIP reformations were generated at a separate workstation.  One or more dose reduction techniques were used on this CT: automated exposure control, adjustment of the mAs and/or kVp according to patient's size, and iterative reconstruction techniques. The specific techniques utilized on this CT exam have been documented in the patient's electronic medical record. Digital imaging and communications and medicine (DICOM) format image data are available to nonaffiliated external healthcare facilities or entities on a secure, media free, reciprocally searchable basis with patient authorization for at least a 12 month period after this study. _______________ FINDINGS: EXAM QUALITY: Overall exam quality is adequate. Pulmonary arterial enhancement is adequate. The breath hold is satisfactory. PULMONARY ARTERIES: There is extensive pulmonary emboli, with complete occlusion of the proximal left lower lobe artery with no contrast seen more distally within the left lower lobe peripheral vessels. There is minimal eccentric clot within proximal lingular segment. There is some partial nonocclusive clot within anterior segment left upper lobe artery. There is complete occlusion of the proximal right upper lobe artery with absent contrast seen more peripherally in the right upper lobe vessels. Partial nonocclusive clot seen within the proximal middle lobe arteries. There is occlusive clot involving proximal lateral segmental right lower lobe artery. No filling defect within the main pulmonary arteries. MEDIASTINUM: There is evidence of right heart strain with RV/LV ratio greater than 1, right ventricle measuring 5.5 cm and left ventricle measuring 3.9 cm. There is mild associated septal bowing and some reflux of contrast into the IVC and hepatic veins. There is also prominence of the azygos vein. Normal heart size with no pericardial effusion. Coronary artery calcifications. Thoracic aorta unremarkable. LYMPH NODES: No enlarged mediastinal or hilar nodes by size criteria. AIRWAY: Unremarkable.  LUNGS: There is 2 mm diameter parenchymal nodule within the posterior right upper lobe, axial image #30. Mild peripheral groundglass density lateral and posterior lateral right upper lobe. No other area of consolidation or mass. PLEURA: No pleural effusion or pneumothorax. UPPER ABDOMEN: Visualized upper abdomen is unremarkable. . OTHER: Degenerative lower cervical and thoracic spondylosis with no acute or aggressive osseous findings. Proximal right humeral prosthesis noted. _______________     IMPRESSION: 1. Extensive bilateral lobar and segmental pulmonary emboli as detailed above, with evidence of right heart strain. This was discussed with Dr. Clark at 1:21 PM. 2. Mild nonspecific peripheral groundglass density right upper lobe. 3. 2 mm right upper lobe nodule nonspecific but likely benign. 2.    Xr Chest Port    Result Date: 7/9/2020  EXAM: XR CHEST PORT CLINICAL INDICATION/HISTORY: sob, tachycardia -Additional: None COMPARISON: None TECHNIQUE: Frontal view of the chest _______________ FINDINGS: HEART AND MEDIASTINUM: Midline cardiac silhouette, normal in size. Unremarkable hilar vascular structures. LUNGS AND PLEURAL SPACES: No focal consolidation, parenchymal opacity. No pneumothorax or pleural effusion. BONY THORAX AND SOFT TISSUES: Total right shoulder arthroplasty. Moderate appearing Left glenohumeral DJD. Otherwise, no acute osseous abnormality _______________     IMPRESSION: 1. No acute cardiopulmonary process. No results found for this or any previous visit. Please note that this dictation was completed with "[employer] the computer voice recognition software. Quite often unanticipated grammatical, syntax, homophones, and other interpretive errors are inadvertently transcribed by the computer software. Please disregard these errors. Please excuse any errors that have escaped final proofreading.      CC: Carla, MD Fernanda

## 2020-07-15 NOTE — PROGRESS NOTES
Problem: Falls - Risk of  Goal: *Absence of Falls  Description: Document Bibi Dennis Fall Risk and appropriate interventions in the flowsheet.   Outcome: Progressing Towards Goal  Note: Fall Risk Interventions:            Medication Interventions: Teach patient to arise slowly                   Problem: Patient Education: Go to Patient Education Activity  Goal: Patient/Family Education  Outcome: Progressing Towards Goal     Problem: Pulmonary Embolism Care Plan (Adult)  Goal: *Improvement of existing pulmonary embolism  Outcome: Progressing Towards Goal  Goal: *Absence of bleeding  Outcome: Progressing Towards Goal  Goal: *Labs within defined limits  Outcome: Progressing Towards Goal     Problem: Patient Education: Go to Patient Education Activity  Goal: Patient/Family Education  Outcome: Progressing Towards Goal

## 2020-07-15 NOTE — PROGRESS NOTES
Case discussed with Dr. Rick Stanford  Pulmonary hypertension  Walk test abnormal   Home oxygen 2L goal saturation >88  Ok to change to oral Ac  juan david wilbrun PCP  CAROLE Siddiqui MD

## 2020-07-15 NOTE — PROGRESS NOTES
2101  Assumed care of patient at this time, assessment complete. Patient alert and oriented x4. Denies SOB and chest pain. Patient lungs clear, diminished bilaterally. Cap refilled  less than 3 seconds. Patient denies numbness and tingling to all extremities. Stated pain 0/10. Patient has 20G IV to left AC . TEDs applied to BLE. Call light and personal items in reach, bed in low position and locked, will continue to monitor patient. Fall risk arm band in place. 0744  Bedside in verbal shift change report given to [address] [place] [address] (oncoming nurse) by Rebeca Castro RN (off going nurse). Report included the following information: SBAR, KARDEX, MAR and recent results. Brianna PAUL

## 2020-07-15 NOTE — ROUTINE PROCESS
Dual AVS reviewed with CAROLE Springer R.N. All medications reviewed individually with patient. Opportunities for questions and concerns provided. Patient discharged via (mode of transport ie. Car, ambulance or air transport) Car  Patient's arm band appropriately discarded.

## 2022-06-24 NOTE — PROGRESS NOTES
Hospitalist Progress Note      PCP: Haylee Rosario DO    Chief Complaint. Presented to hospital for SOB    Date of Admission: 6/23/2022      Subjective:   denies chest pain, nausea, vomiting, shortness of breath, fever or chills. mention feels overall better    Medications:  Reviewed    Infusion Medications    furosemide (LASIX) 1mg/ml infusion 5 mg/hr (06/24/22 1427)    sodium chloride       Scheduled Medications    lidocaine  1 patch TransDERmal Daily    metoprolol tartrate  50 mg Oral BID    apixaban  2.5 mg Oral BID    aspirin  81 mg Oral Daily    atorvastatin  80 mg Oral Nightly    Vitamin D  2,000 Units Oral Daily    sodium chloride flush  5-40 mL IntraVENous 2 times per day     PRN Meds: sodium chloride flush, sodium chloride, ondansetron **OR** ondansetron, polyethylene glycol, HYDROcodone-acetaminophen      Intake/Output Summary (Last 24 hours) at 6/24/2022 1659  Last data filed at 6/24/2022 1113  Gross per 24 hour   Intake 240 ml   Output --   Net 240 ml       Physical Exam Performed:    BP (!) 92/55   Pulse 98   Temp 97.4 °F (36.3 °C) (Oral)   Resp 18   Ht 5' (1.524 m)   Wt 139 lb 3.2 oz (63.1 kg)   SpO2 93%   BMI 27.19 kg/m²     General appearance: No apparent distress,   HEENT:  Conjunctivae/corneas clear. Neck: Supple, with full range of motion. Respiratory:  Normal respiratory effort. Clear to auscultation, bilaterally without Rales/Wheezes/Rhonchi. Cardiovascular: Regular rate and rhythm with normal S1/S2 without murmurs or rubs  Abdomen: Soft, non-tender, non-distended, normal bowel sounds. Musculoskeletal: No cyanosis or edema bilaterally  Neurologic:  without any focal sensory/motor deficits.  grossly non-focal.  Psychiatric: Alert and oriented, Normal mood  Peripheral Pulses: +2 palpable, equal bilaterally       Labs:   Recent Labs     06/23/22 1912   WBC 7.1   HGB 9.4*   HCT 28.2*        Recent Labs     06/23/22 1912 06/24/22  0452   * 131*   K 4.3 4.9 [facility]                  Pulmonary, Critical Care, and Sleep Medicine     Name: Annabel Gomez MRN: [id number]   : 1955 Hospital: [facility]    Date: 2020        [facility] Note                                              Consult requesting physician: Dr. Lyndsey Jj, Dr. Rodney Valle  Reason for Consult: PE      IMPRESSION:   Acute pulmonary embolism with acute cor pulmonale (bilateral extensive lobar and subsegmental PEs with RV strain. Hx PE/DVT after knee surgery in about ; lives on a boat since > 2 years; RV strain; d-dimer 5.38; BNP 6979; troponin 0 0.04). I26.09 ·     · 2 mm RUL pulmonary nodule. ·   Patient Active Problem List   Diagnosis Code    Acute pulmonary embolism with acute cor pulmonale (HCC) I26.09    Sinus tachycardia R00.0    SOB (shortness of breath) R06.02           RECOMMENDATIONS:   Continue heparin drip. Patient lives [place] a boat and so frequent INR check would be problematic and so patient agrees to go on newer oral anticoagulants. Can transition from heparin to newer oral anticoagulants. On room air resting. Echo and PVL LE pending. COVID19 negative (ordered due to mild nonspecific GGO in RUL); second COVID19 pending. Low probability for COVID19 pneumonia. Recommend outpatient hematology work-up. Consider outpatient pulmonary nodule evaluation. FiO2 to keep SpO2 >=92%, HOB >=30 degree, aspiration precautions, aggressive pulmonary toileting, incentive spirometry, PT/OT eval and treat, mobilization. Other issues management by primary team and respective consultants. Further recommendations will be based on the patient's response to recommended treatment and results of the investigation ordered. Quality Care: PPI, after knee surgery in the past, prophylaxis, HOB elevated, infection control all reviewed and addressed. Discussed with patient radiologic work up showed, answered all questions to their satisfaction.    Care plan CL 92* 95*   CO2 27 21   BUN 65* 66*   CREATININE 2.9* 2.8*   CALCIUM 9.3 9.1     Recent Labs     06/23/22 1912   AST 37   ALT 36   BILITOT 0.5   ALKPHOS 65     No results for input(s): INR in the last 72 hours. Recent Labs     06/23/22 1912   TROPONINI 0.01       Urinalysis:      Lab Results   Component Value Date    NITRU Negative 06/23/2022    WBCUA 6-9 06/23/2022    BACTERIA 1+ 06/23/2022    RBCUA 0-2 06/23/2022    BLOODU Negative 06/23/2022    SPECGRAV 1.015 06/23/2022    GLUCOSEU Negative 06/23/2022       Radiology:  US RENAL COMPLETE   Final Result      1. Bilateral cortical renal atrophic changes concordant with chronic medical renal disease and no evidence of hydronephrosis   2. Nonobstructive left renal nephrolithiasis   3. Benign left renal cyst               XR CHEST PORTABLE   Final Result      Patchy bilateral airspace disease. Correlate for pneumonia are less typical for pulmonary edema. NM MYOCARDIAL SPECT REST EXERCISE OR RX    (Results Pending)         Assessment/Plan:    Active Hospital Problems    Diagnosis     CHF (congestive heart failure), NYHA class I, acute on chronic, combined ([facility] [address] [I50.43]      Priority: Medium     #Acute on chronic exacerbation of combined CHF, likely exacerbated by valvular pathology/tachycardia  #Moderate pulmonary hypertension  #AGUILA on CKD, rule out cardiorenal syndrome given EF of 25%  #Chronic anemia-stable at baseline  #Persistent atrial fibrillation with RVR  #Physical deconditioning over the past couple of months with worsening fatigue and somnolence  #Chronic pain     PLAN:  - started on IV lasix gtt, monitor BMP, cardiology/nephrology following  -Strict intake and output, daily weights  -Monitor electrolytes and renal function  -CHF team and cardiology consult  -Nephrology consult regarding worsening kidney function. Patient is scheduled to see a nephrologist/Dr. Jackson Rivas which is in September.   Patient and family agreeable to see a nephrologist from discussed with nursing, RN. Subjective/History:     Andreina Gutierrez is a 72 y.o. male with PMHx significant for DVT/PE after knee surgery in about 2012, peptic ulcer disease, who came to ER with shortness of breath since 2.5 months off and on, diagnosed with mild nonspecific peripheral GGO RUL with extensive bilateral lobar and segmental pulmonary emboli with right heart strain. Patient lives on a boat since more than 2 years. Denies recent surgery, hospitalization, acute illness, long car or airline travel. 7/12/2020   On RA resting  COVID 19 negative; second COVID19 7/11/2020: Pending. Tolerating heparin drip without any bleeding. Platelets stable normal.  No cough fever leg edema cp  Dyspnea improving. No other pulmonary issues reported. Review of Systems:   HEENT: No epistaxis, no nasal drainage, no difficulty in swallowing, no redness in eyes  Respiratory: as above  Cardiovascular: no chest pain, no palpitations, no chronic leg edema, no syncope  Gastrointestinal: no abd pain, no vomiting, no diarrhea, no bleeding symptoms  Genitourinary: No urinary symptoms or hematuria  Integument/breast: No ulcers or rashes  Musculoskeletal:Neg  Neurological: No focal weakness, no seizures, no headaches  Behvioral/Psych: No anxiety, no depression  Constitutional: No fever, no chills, no weight loss, no night sweats       No Known Allergies     Past Medical History:   Diagnosis Date    DVT of leg (deep venous thrombosis) (HCC)     H pylori ulcer     PE (pulmonary thromboembolism) (HCC)     Prostate CA ([facility] [address]         History reviewed. No pertinent surgical history. History reviewed. No pertinent family history.      Social History     Tobacco Use    Smoking status: Never Smoker    Smokeless tobacco: Never Used   Substance Use Topics    Alcohol use: Yes        Prior to Admission medications    Not on File       Current Facility-Administered Medications   Medication Dose Route Frequency    heparin 25,000 units in D5W 250 ml infusion  18-36 Units/kg/hr IntraVENous TITRATE    ascorbic acid (vitamin C) (VITAMIN C) tablet 500 mg  500 mg Oral DAILY    cholecalciferol (VITAMIN D3) (400 Units /10 mcg) tablet 5 Tab  2,000 Units Oral DAILY    zinc sulfate (ZINCATE) 220 (50) mg capsule 1 Cap  1 Cap Oral DAILY    melatonin tablet 5 mg  5 mg Oral QHS    pantoprazole (PROTONIX) tablet 40 mg  40 mg Oral DAILY         Objective:   Vital Signs:    Visit Vitals  /82 (BP 1 Location: Left arm)   Pulse 74   Temp 98.7 °F (37.1 °C)   Resp 17   Ht 6' 2\" (1.88 m)   Wt 106.6 kg (235 lb)   SpO2 96%   BMI 30.17 kg/m²       O2 Device: Room air       Temp (24hrs), Av.4 °F (36.9 °C), Min:97.8 °F (36.6 °C), Max:98.7 °F (37.1 °C)       Intake/Output:   Last shift:      No intake/output data recorded. Last 3 shifts: 07/10 1901 -  0700  In: -   Out: 2125 [Urine:2125]    Intake/Output Summary (Last 24 hours) at 2020 0957  Last data filed at 2020 0320  Gross per 24 hour   Intake    Output 1225 ml   Net -1225 ml       Physical Exam:     General/Neurology: Alert, Awake, NAD. Head:   Normocephalic, without obvious abnormality, atraumatic. Neck:   Supple, symmetric. No carotid bruit. No lymphadenopathy. Trachea midline  Lung:   Good air entry bilateral equal. No rales. No rhonchi. No wheezing. No stridors. No prolongded expiration. No accessory muscle use. Heart:   Regular rate & rhythm. S1 S2 present. No murmur. No JVD. Abdomen:  Soft. NT. ND. +BS. No masses. Extremities:  No pedal edema. No cyanosis. No clubbing. Pulses: 2+ and symmetric in DP. Capillary refill: normal.   Lymphatic:  No cervical or supraclavicular palpable lymphadenopathy.        Data:       Recent Results (from the past 24 hour(s))   SARS-COV-2    Collection Time: 20 11:00 AM   Result Value Ref Range    SARS-CoV-2 PENDING     Specimen source Nasopharyngeal     PTT    Collection Time: 20  3:30 AM   Result Value Ref Range here  -Monitor H&H closely given worsening CKD.   No evidence of active bleeding  -Continue on beta-blocker and Eliquis  -Continue her home medications appropriately, including for chronic pain with caution    DVT Prophylaxis: Eliquis  Diet: Diet NPO  Code Status: Full Code    PT/OT Eval Status: ordered    Dispo/Plan of care - continue Laisx gtt per nephrology    Karine Herman MD aPTT 69.6 (H) 23.0 - 36.4 SEC   CBC WITH AUTOMATED DIFF    Collection Time: 07/12/20  3:30 AM   Result Value Ref Range    WBC 8.4 4.6 - 13.2 K/uL    RBC 5.60 (H) 4.70 - 5.50 M/uL    HGB 17.5 (H) 13.0 - 16.0 g/dL    HCT 52.7 (H) 36.0 - 48.0 %    MCV 94.1 74.0 - 97.0 FL    MCH 31.3 24.0 - 34.0 PG    MCHC 33.2 31.0 - 37.0 g/dL    RDW 13.8 11.6 - 14.5 %    PLATELET 484 161 - 646 K/uL    MPV 11.3 9.2 - 11.8 FL    NEUTROPHILS 65 40 - 73 %    LYMPHOCYTES 27 21 - 52 %    MONOCYTES 6 3 - 10 %    EOSINOPHILS 2 0 - 5 %    BASOPHILS 0 0 - 2 %    ABS. NEUTROPHILS 5.5 1.8 - 8.0 K/UL    ABS. LYMPHOCYTES 2.2 0.9 - 3.6 K/UL    ABS. MONOCYTES 0.5 0.05 - 1.2 K/UL    ABS. EOSINOPHILS 0.1 0.0 - 0.4 K/UL    ABS. BASOPHILS 0.0 0.0 - 0.1 K/UL    DF AUTOMATED     METABOLIC PANEL, COMPREHENSIVE    Collection Time: 07/12/20  3:30 AM   Result Value Ref Range    Sodium 139 136 - 145 mmol/L    Potassium 4.6 3.5 - 5.5 mmol/L    Chloride 106 100 - 111 mmol/L    CO2 27 21 - 32 mmol/L    Anion gap 6 3.0 - 18 mmol/L    Glucose 90 74 - 99 mg/dL    BUN 15 7.0 - 18 MG/DL    Creatinine 0.98 0.6 - 1.3 MG/DL    BUN/Creatinine ratio 15 12 - 20      GFR est AA >60 >60 ml/min/1.73m2    GFR est non-AA >60 >60 ml/min/1.73m2    Calcium 8.5 8.5 - 10.1 MG/DL    Bilirubin, total 1.2 (H) 0.2 - 1.0 MG/DL    ALT (SGPT) 92 (H) 16 - 61 U/L    AST (SGOT) 40 (H) 10 - 38 U/L    Alk.  phosphatase 104 45 - 117 U/L    Protein, total 6.8 6.4 - 8.2 g/dL    Albumin 3.2 (L) 3.4 - 5.0 g/dL    Globulin 3.6 2.0 - 4.0 g/dL    A-G Ratio 0.9 0.8 - 1.7     MAGNESIUM    Collection Time: 07/12/20  3:30 AM   Result Value Ref Range    Magnesium 2.4 1.6 - 2.6 mg/dL   PHOSPHORUS    Collection Time: 07/12/20  3:30 AM   Result Value Ref Range    Phosphorus 3.4 2.5 - 4.9 MG/DL         Chemistry Recent Labs     07/12/20  0330 07/11/20  0300 07/09/20  1147   GLU 90 104* 94    138 138   K 4.6 4.3 4.8    107 107   CO2 27 24 26   BUN 15 15 21*   CREA 0.98 0.96 1.22   CA 8.5 8.1* 9.1   MG 2.4  --  2.4   PHOS 3.4  --   --    AGAP 6 7 5   BUCR 15 16 17     --  93   TP 6.8  --  7.3   ALB 3.2*  --  3.7   GLOB 3.6  --  3.6   AGRAT 0.9  --  1.0        Lactic Acid No results found for: LAC  No results for input(s): LAC in the last 72 hours. Liver Enzymes Protein, total   Date Value Ref Range Status   07/12/2020 6.8 6.4 - 8.2 g/dL Final     Albumin   Date Value Ref Range Status   07/12/2020 3.2 (L) 3.4 - 5.0 g/dL Final     Globulin   Date Value Ref Range Status   07/12/2020 3.6 2.0 - 4.0 g/dL Final     A-G Ratio   Date Value Ref Range Status   07/12/2020 0.9 0.8 - 1.7   Final     Alk. phosphatase   Date Value Ref Range Status   07/12/2020 104 45 - 117 U/L Final     Recent Labs     07/12/20  0330 07/09/20  1147   TP 6.8 7.3   ALB 3.2* 3.7   GLOB 3.6 3.6   AGRAT 0.9 1.0    93        CBC w/Diff Recent Labs     07/12/20  0330 07/11/20  0300 07/10/20  0635 07/09/20  1147   WBC 8.4 9.0 9.4 12.6   RBC 5.60* 5.41 5.31 5.65*   HGB 17.5* 16.7* 16.5* 17.9*   HCT 52.7* 50.4* 49.6* 53.0*    160 149 169   GRANS 65  --  68 77*   LYMPH 27  --  22 14*   EOS 2  --  1 0        Cardiac Enzymes No results found for: CPK, CK, CKMMB, CKMB, RCK3, CKMBT, CKNDX, CKND1, ANJEL, TROPT, TROIQ, RIVER, TROPT, TNIPOC, BNP, BNPP     BNP No results found for: BNP, BNPP, XBNPT     Coagulation Recent Labs     07/12/20  0330 07/11/20  0300 07/10/20  0635   APTT 69.6* 74.8* 83.4*         Thyroid  No results found for: T4, T3U, TSH, TSHEXT, TSHEXT    No results found for: T4     Urinalysis No results found for: COLOR, APPRN, SPGRU, REFSG, PIYUSH, PROTU, GLUCU, KETU, BILU, UROU, ARACELY, LEUKU, GLUKE, EPSU, BACTU, WBCU, RBCU, CASTS, UCRY     Micro  No results for input(s): SDES, CULT in the last 72 hours. No results for input(s): CULT in the last 72 hours. ABG No results for input(s): PHI, PHI, POC2, PCO2I, PO2, PO2I, HCO3, HCO3I, FIO2, FIO2I in the last 72 hours.        CT (Most Recent) (CT chest reviewed by me) Results from Hospital Encounter encounter on 07/09/20   CTA CHEST W OR W WO CONT    Narrative EXAM: CTA Chest    CLINICAL INDICATION/HISTORY: Shortness of breath, tachycardia. Possible PE.    COMPARISON: Plain film chest same day    TECHNIQUE: Axial CT imaging from the thoracic inlet through the diaphragm with  intravenous contrast utilizing CTA study for pulmonary artery evaluation. Coronal and sagittal MIP reformations were generated at a separate workstation. One or more dose reduction techniques were used on this CT: automated exposure  control, adjustment of the mAs and/or kVp according to patient's size, and  iterative reconstruction techniques. The specific techniques utilized on this CT  exam have been documented in the patient's electronic medical record. Digital  imaging and communications and medicine (DICOM) format image data are available  to nonaffiliated external healthcare facilities or entities on a secure, media  free, reciprocally searchable basis with patient authorization for at least a 12  month period after this study. _______________    FINDINGS:    EXAM QUALITY: Overall exam quality is adequate. Pulmonary arterial enhancement  is adequate. The breath hold is satisfactory. PULMONARY ARTERIES: There is extensive pulmonary emboli, with complete occlusion  of the proximal left lower lobe artery with no contrast seen more distally  within the left lower lobe peripheral vessels. There is minimal eccentric clot  within proximal lingular segment. There is some partial nonocclusive clot within  anterior segment left upper lobe artery. There is complete occlusion of the  proximal right upper lobe artery with absent contrast seen more peripherally in  the right upper lobe vessels. Partial nonocclusive clot seen within the proximal  middle lobe arteries. There is occlusive clot involving proximal lateral  segmental right lower lobe artery.  No filling defect within the main pulmonary  arteries. MEDIASTINUM: There is evidence of right heart strain with RV/LV ratio greater  than 1, right ventricle measuring 5.5 cm and left ventricle measuring 3.9 cm. There is mild associated septal bowing and some reflux of contrast into the IVC  and hepatic veins. There is also prominence of the azygos vein. Normal heart  size with no pericardial effusion. Coronary artery calcifications. Thoracic  aorta unremarkable. LYMPH NODES: No enlarged mediastinal or hilar nodes by size criteria. AIRWAY: Unremarkable. LUNGS: There is 2 mm diameter parenchymal nodule within the posterior right  upper lobe, axial image #30. Mild peripheral groundglass density lateral and  posterior lateral right upper lobe. No other area of consolidation or mass. PLEURA: No pleural effusion or pneumothorax. UPPER ABDOMEN: Visualized upper abdomen is unremarkable. .    OTHER: Degenerative lower cervical and thoracic spondylosis with no acute or  aggressive osseous findings. Proximal right humeral prosthesis noted. _______________      Impression IMPRESSION:    1. Extensive bilateral lobar and segmental pulmonary emboli as detailed above,  with evidence of right heart strain. This was discussed with Dr. Herlinda Titus at  1:21 PM.    2. Mild nonspecific peripheral groundglass density right upper lobe. 3. 2 mm right upper lobe nodule nonspecific but likely benign. 2.            XR (Most Recent). CXR  reviewed by me and compared with previous CXR Results from Hospital Encounter encounter on 07/09/20   XR CHEST PORT    Narrative EXAM: XR CHEST PORT    CLINICAL INDICATION/HISTORY: sob, tachycardia  -Additional: None    COMPARISON: None    TECHNIQUE: Frontal view of the chest    _______________    FINDINGS:    HEART AND MEDIASTINUM: Midline cardiac silhouette, normal in size. Unremarkable  hilar vascular structures. LUNGS AND PLEURAL SPACES: No focal consolidation, parenchymal opacity.  No  pneumothorax or pleural effusion. BONY THORAX AND SOFT TISSUES: Total right shoulder arthroplasty. Moderate  appearing Left glenohumeral DJD. Otherwise, no acute osseous abnormality    _______________      Impression IMPRESSION:    1. No acute cardiopulmonary process.              Debby Julian MD  7/12/2020
